# Patient Record
Sex: MALE | Race: WHITE | Employment: FULL TIME | URBAN - METROPOLITAN AREA
[De-identification: names, ages, dates, MRNs, and addresses within clinical notes are randomized per-mention and may not be internally consistent; named-entity substitution may affect disease eponyms.]

---

## 2022-02-23 ENCOUNTER — APPOINTMENT (OUTPATIENT)
Dept: CT IMAGING | Age: 52
DRG: 640 | End: 2022-02-23
Attending: EMERGENCY MEDICINE
Payer: COMMERCIAL

## 2022-02-23 ENCOUNTER — HOSPITAL ENCOUNTER (INPATIENT)
Age: 52
LOS: 6 days | Discharge: HOME | DRG: 640 | End: 2022-03-01
Attending: EMERGENCY MEDICINE | Admitting: ANESTHESIOLOGY
Payer: COMMERCIAL

## 2022-02-23 DIAGNOSIS — F10.10 ALCOHOL ABUSE: ICD-10-CM

## 2022-02-23 DIAGNOSIS — E87.1 HYPONATREMIA: Primary | ICD-10-CM

## 2022-02-23 DIAGNOSIS — R00.0 SINUS TACHYCARDIA: ICD-10-CM

## 2022-02-23 LAB
ALBUMIN SERPL-MCNC: 4.5 G/DL (ref 3.5–5)
ALBUMIN SERPL-MCNC: 4.6 G/DL (ref 3.5–5)
ALBUMIN/GLOB SERPL: 1.2 {RATIO} (ref 1.1–2.2)
ALP SERPL-CCNC: 71 U/L (ref 45–117)
ALT SERPL-CCNC: 74 U/L (ref 12–78)
ANION GAP SERPL CALC-SCNC: 11 MMOL/L (ref 5–15)
ANION GAP SERPL CALC-SCNC: 8 MMOL/L (ref 5–15)
ANION GAP SERPL CALC-SCNC: 9 MMOL/L (ref 5–15)
APPEARANCE UR: CLEAR
AST SERPL-CCNC: 74 U/L (ref 15–37)
BACTERIA URNS QL MICRO: NEGATIVE /HPF
BASOPHILS # BLD: 0 K/UL (ref 0–0.1)
BASOPHILS NFR BLD: 0 % (ref 0–1)
BILIRUB SERPL-MCNC: 0.9 MG/DL (ref 0.2–1)
BILIRUB UR QL: NEGATIVE
BUN SERPL-MCNC: 6 MG/DL (ref 6–20)
BUN SERPL-MCNC: 7 MG/DL (ref 6–20)
BUN SERPL-MCNC: 8 MG/DL (ref 6–20)
BUN/CREAT SERPL: 12 (ref 12–20)
BUN/CREAT SERPL: 9 (ref 12–20)
BUN/CREAT SERPL: 9 (ref 12–20)
CALCIUM SERPL-MCNC: 8.9 MG/DL (ref 8.5–10.1)
CALCIUM SERPL-MCNC: 9.4 MG/DL (ref 8.5–10.1)
CALCIUM SERPL-MCNC: 9.5 MG/DL (ref 8.5–10.1)
CHLORIDE SERPL-SCNC: 76 MMOL/L (ref 97–108)
CHLORIDE SERPL-SCNC: 77 MMOL/L (ref 97–108)
CHLORIDE SERPL-SCNC: 82 MMOL/L (ref 97–108)
CO2 SERPL-SCNC: 23 MMOL/L (ref 21–32)
CO2 SERPL-SCNC: 24 MMOL/L (ref 21–32)
CO2 SERPL-SCNC: 27 MMOL/L (ref 21–32)
COLOR UR: ABNORMAL
COMMENT, HOLDF: NORMAL
COMMENT, HOLDF: NORMAL
CREAT SERPL-MCNC: 0.68 MG/DL (ref 0.7–1.3)
CREAT SERPL-MCNC: 0.68 MG/DL (ref 0.7–1.3)
CREAT SERPL-MCNC: 0.78 MG/DL (ref 0.7–1.3)
CREAT UR-MCNC: 44.2 MG/DL
DIFFERENTIAL METHOD BLD: ABNORMAL
EOSINOPHIL # BLD: 0 K/UL (ref 0–0.4)
EOSINOPHIL NFR BLD: 0 % (ref 0–7)
EPITH CASTS URNS QL MICRO: ABNORMAL /LPF
ERYTHROCYTE [DISTWIDTH] IN BLOOD BY AUTOMATED COUNT: 11.7 % (ref 11.5–14.5)
GLOBULIN SER CALC-MCNC: 3.7 G/DL (ref 2–4)
GLUCOSE SERPL-MCNC: 100 MG/DL (ref 65–100)
GLUCOSE SERPL-MCNC: 88 MG/DL (ref 65–100)
GLUCOSE SERPL-MCNC: 99 MG/DL (ref 65–100)
GLUCOSE UR STRIP.AUTO-MCNC: NEGATIVE MG/DL
HCT VFR BLD AUTO: 40.9 % (ref 36.6–50.3)
HGB BLD-MCNC: 14.7 G/DL (ref 12.1–17)
HGB UR QL STRIP: NEGATIVE
HYALINE CASTS URNS QL MICRO: ABNORMAL /LPF (ref 0–5)
IMM GRANULOCYTES # BLD AUTO: 0 K/UL (ref 0–0.04)
IMM GRANULOCYTES NFR BLD AUTO: 0 % (ref 0–0.5)
KETONES UR QL STRIP.AUTO: 15 MG/DL
LEUKOCYTE ESTERASE UR QL STRIP.AUTO: NEGATIVE
LYMPHOCYTES # BLD: 0.8 K/UL (ref 0.8–3.5)
LYMPHOCYTES NFR BLD: 13 % (ref 12–49)
MAGNESIUM SERPL-MCNC: 2 MG/DL (ref 1.6–2.4)
MCH RBC QN AUTO: 33.5 PG (ref 26–34)
MCHC RBC AUTO-ENTMCNC: 35.9 G/DL (ref 30–36.5)
MCV RBC AUTO: 93.2 FL (ref 80–99)
MONOCYTES # BLD: 0.8 K/UL (ref 0–1)
MONOCYTES NFR BLD: 13 % (ref 5–13)
NEUTS SEG # BLD: 4.2 K/UL (ref 1.8–8)
NEUTS SEG NFR BLD: 74 % (ref 32–75)
NITRITE UR QL STRIP.AUTO: NEGATIVE
NRBC # BLD: 0 K/UL (ref 0–0.01)
NRBC BLD-RTO: 0 PER 100 WBC
OSMOLALITY SERPL: 240 MOSM/KG H2O
OSMOLALITY UR: 288 MOSM/KG H2O
PH UR STRIP: 8 [PH] (ref 5–8)
PHOSPHATE SERPL-MCNC: 3 MG/DL (ref 2.6–4.7)
PHOSPHATE SERPL-MCNC: 3.2 MG/DL (ref 2.6–4.7)
PLATELET # BLD AUTO: 266 K/UL (ref 150–400)
PMV BLD AUTO: 8.1 FL (ref 8.9–12.9)
POTASSIUM SERPL-SCNC: 3.9 MMOL/L (ref 3.5–5.1)
POTASSIUM SERPL-SCNC: 4.2 MMOL/L (ref 3.5–5.1)
POTASSIUM SERPL-SCNC: 4.6 MMOL/L (ref 3.5–5.1)
POTASSIUM UR-SCNC: 63 MMOL/L
PROT SERPL-MCNC: 8.3 G/DL (ref 6.4–8.2)
PROT UR STRIP-MCNC: NEGATIVE MG/DL
PROT UR-MCNC: 9 MG/DL (ref 0–11.9)
RBC # BLD AUTO: 4.39 M/UL (ref 4.1–5.7)
RBC #/AREA URNS HPF: ABNORMAL /HPF (ref 0–5)
RBC MORPH BLD: ABNORMAL
SAMPLES BEING HELD,HOLD: NORMAL
SAMPLES BEING HELD,HOLD: NORMAL
SODIUM SERPL-SCNC: 111 MMOL/L (ref 136–145)
SODIUM SERPL-SCNC: 112 MMOL/L (ref 136–145)
SODIUM SERPL-SCNC: 114 MMOL/L (ref 136–145)
SODIUM UR-SCNC: 38 MMOL/L
SP GR UR REFRACTOMETRY: 1.01 (ref 1–1.03)
TSH SERPL DL<=0.05 MIU/L-ACNC: 0.49 UIU/ML (ref 0.36–3.74)
UROBILINOGEN UR QL STRIP.AUTO: 1 EU/DL (ref 0.2–1)
WBC # BLD AUTO: 5.8 K/UL (ref 4.1–11.1)
WBC URNS QL MICRO: ABNORMAL /HPF (ref 0–4)

## 2022-02-23 PROCEDURE — 87086 URINE CULTURE/COLONY COUNT: CPT

## 2022-02-23 PROCEDURE — 74011000250 HC RX REV CODE- 250: Performed by: EMERGENCY MEDICINE

## 2022-02-23 PROCEDURE — 74011250637 HC RX REV CODE- 250/637: Performed by: NURSE PRACTITIONER

## 2022-02-23 PROCEDURE — 74011250636 HC RX REV CODE- 250/636: Performed by: EMERGENCY MEDICINE

## 2022-02-23 PROCEDURE — 83935 ASSAY OF URINE OSMOLALITY: CPT

## 2022-02-23 PROCEDURE — 84156 ASSAY OF PROTEIN URINE: CPT

## 2022-02-23 PROCEDURE — 84300 ASSAY OF URINE SODIUM: CPT

## 2022-02-23 PROCEDURE — 84443 ASSAY THYROID STIM HORMONE: CPT

## 2022-02-23 PROCEDURE — 65610000006 HC RM INTENSIVE CARE

## 2022-02-23 PROCEDURE — 74011000636 HC RX REV CODE- 636: Performed by: ANESTHESIOLOGY

## 2022-02-23 PROCEDURE — 99285 EMERGENCY DEPT VISIT HI MDM: CPT

## 2022-02-23 PROCEDURE — 83735 ASSAY OF MAGNESIUM: CPT

## 2022-02-23 PROCEDURE — 36415 COLL VENOUS BLD VENIPUNCTURE: CPT

## 2022-02-23 PROCEDURE — 81001 URINALYSIS AUTO W/SCOPE: CPT

## 2022-02-23 PROCEDURE — 71260 CT THORAX DX C+: CPT

## 2022-02-23 PROCEDURE — 93005 ELECTROCARDIOGRAM TRACING: CPT

## 2022-02-23 PROCEDURE — 74177 CT ABD & PELVIS W/CONTRAST: CPT

## 2022-02-23 PROCEDURE — 80069 RENAL FUNCTION PANEL: CPT

## 2022-02-23 PROCEDURE — 84100 ASSAY OF PHOSPHORUS: CPT

## 2022-02-23 PROCEDURE — 80053 COMPREHEN METABOLIC PANEL: CPT

## 2022-02-23 PROCEDURE — 74011000250 HC RX REV CODE- 250: Performed by: NURSE PRACTITIONER

## 2022-02-23 PROCEDURE — 70450 CT HEAD/BRAIN W/O DYE: CPT

## 2022-02-23 PROCEDURE — 82570 ASSAY OF URINE CREATININE: CPT

## 2022-02-23 PROCEDURE — 84133 ASSAY OF URINE POTASSIUM: CPT

## 2022-02-23 PROCEDURE — 85025 COMPLETE CBC W/AUTO DIFF WBC: CPT

## 2022-02-23 PROCEDURE — 83930 ASSAY OF BLOOD OSMOLALITY: CPT

## 2022-02-23 RX ORDER — 3% SODIUM CHLORIDE 3 G/100ML
100 INJECTION, SOLUTION INTRAVENOUS CONTINUOUS
Status: DISPENSED | OUTPATIENT
Start: 2022-02-23 | End: 2022-02-23

## 2022-02-23 RX ORDER — BISACODYL 5 MG
5 TABLET, DELAYED RELEASE (ENTERIC COATED) ORAL DAILY PRN
Status: DISCONTINUED | OUTPATIENT
Start: 2022-02-23 | End: 2022-03-01 | Stop reason: HOSPADM

## 2022-02-23 RX ORDER — SODIUM CHLORIDE 0.9 % (FLUSH) 0.9 %
5-40 SYRINGE (ML) INJECTION AS NEEDED
Status: DISCONTINUED | OUTPATIENT
Start: 2022-02-23 | End: 2022-03-01 | Stop reason: HOSPADM

## 2022-02-23 RX ORDER — LANOLIN ALCOHOL/MO/W.PET/CERES
100 CREAM (GRAM) TOPICAL DAILY
Status: DISCONTINUED | OUTPATIENT
Start: 2022-02-23 | End: 2022-02-24

## 2022-02-23 RX ORDER — LORAZEPAM 1 MG/1
1 TABLET ORAL
Status: DISCONTINUED | OUTPATIENT
Start: 2022-02-23 | End: 2022-03-01 | Stop reason: HOSPADM

## 2022-02-23 RX ORDER — FOLIC ACID 1 MG/1
1 TABLET ORAL DAILY
Status: DISCONTINUED | OUTPATIENT
Start: 2022-02-23 | End: 2022-03-01 | Stop reason: HOSPADM

## 2022-02-23 RX ORDER — LORAZEPAM 1 MG/1
2 TABLET ORAL
Status: DISCONTINUED | OUTPATIENT
Start: 2022-02-23 | End: 2022-03-01 | Stop reason: HOSPADM

## 2022-02-23 RX ORDER — SODIUM CHLORIDE 9 MG/ML
50 INJECTION, SOLUTION INTRAVENOUS CONTINUOUS
Status: DISCONTINUED | OUTPATIENT
Start: 2022-02-23 | End: 2022-02-24

## 2022-02-23 RX ORDER — SODIUM CHLORIDE 0.9 % (FLUSH) 0.9 %
5-40 SYRINGE (ML) INJECTION EVERY 8 HOURS
Status: DISCONTINUED | OUTPATIENT
Start: 2022-02-23 | End: 2022-03-01 | Stop reason: HOSPADM

## 2022-02-23 RX ORDER — ACETAMINOPHEN 325 MG/1
650 TABLET ORAL
Status: DISCONTINUED | OUTPATIENT
Start: 2022-02-23 | End: 2022-03-01 | Stop reason: HOSPADM

## 2022-02-23 RX ORDER — LANOLIN ALCOHOL/MO/W.PET/CERES
9 CREAM (GRAM) TOPICAL
Status: DISCONTINUED | OUTPATIENT
Start: 2022-02-23 | End: 2022-03-01 | Stop reason: HOSPADM

## 2022-02-23 RX ORDER — HYDRALAZINE HYDROCHLORIDE 20 MG/ML
10-20 INJECTION INTRAMUSCULAR; INTRAVENOUS
Status: DISCONTINUED | OUTPATIENT
Start: 2022-02-23 | End: 2022-03-01 | Stop reason: HOSPADM

## 2022-02-23 RX ORDER — ENOXAPARIN SODIUM 100 MG/ML
40 INJECTION SUBCUTANEOUS DAILY
Status: DISCONTINUED | OUTPATIENT
Start: 2022-02-24 | End: 2022-03-01 | Stop reason: HOSPADM

## 2022-02-23 RX ORDER — LABETALOL HYDROCHLORIDE 5 MG/ML
10 INJECTION, SOLUTION INTRAVENOUS ONCE
Status: COMPLETED | OUTPATIENT
Start: 2022-02-23 | End: 2022-02-23

## 2022-02-23 RX ORDER — SODIUM CHLORIDE 9 MG/ML
630 INJECTION, SOLUTION INTRAVENOUS CONTINUOUS
Status: DISCONTINUED | OUTPATIENT
Start: 2022-02-23 | End: 2022-02-23

## 2022-02-23 RX ORDER — ACETAMINOPHEN 650 MG/1
650 SUPPOSITORY RECTAL
Status: DISCONTINUED | OUTPATIENT
Start: 2022-02-23 | End: 2022-03-01 | Stop reason: HOSPADM

## 2022-02-23 RX ORDER — ONDANSETRON 2 MG/ML
4 INJECTION INTRAMUSCULAR; INTRAVENOUS
Status: DISCONTINUED | OUTPATIENT
Start: 2022-02-23 | End: 2022-03-01 | Stop reason: HOSPADM

## 2022-02-23 RX ADMIN — LORAZEPAM 2 MG: 1 TABLET ORAL at 20:40

## 2022-02-23 RX ADMIN — Medication 1 TABLET: at 16:56

## 2022-02-23 RX ADMIN — LORAZEPAM 2 MG: 1 TABLET ORAL at 18:03

## 2022-02-23 RX ADMIN — SODIUM CHLORIDE 100 ML/HR: 3 INJECTION, SOLUTION INTRAVENOUS at 16:01

## 2022-02-23 RX ADMIN — FOLIC ACID 1 MG: 1 TABLET ORAL at 16:38

## 2022-02-23 RX ADMIN — LABETALOL HYDROCHLORIDE 10 MG: 5 INJECTION INTRAVENOUS at 16:57

## 2022-02-23 RX ADMIN — IOPAMIDOL 100 ML: 755 INJECTION, SOLUTION INTRAVENOUS at 17:28

## 2022-02-23 RX ADMIN — Medication 100 MG: at 16:56

## 2022-02-23 RX ADMIN — SODIUM CHLORIDE, PRESERVATIVE FREE 10 ML: 5 INJECTION INTRAVENOUS at 16:05

## 2022-02-23 NOTE — PROGRESS NOTES
The course was discussed with Dr. Allan Sandhu (ER Attending) via telephone this afternoon. There is a history of fall. The serum sodium was 111. Recommend a 100 ml bolus of 3 % Saline (now) --> over 30 minutes. Repeat the serum sodium in 2 to 3 hours. Will follow.

## 2022-02-23 NOTE — CONSULTS
NEPHROLOGY CONSULT NOTE     Patient: Juan West MRN: [id number]  PCP: None   :     1970  Age:   49 y.o. Sex:  male      Referring physician: Allan Sparks DO     Reason for consultation:     Hyponatremia. Mr. Kassidy Cho was seen and examined in the emergency room at [facility] this afternoon. He was located in bed #3. Mrs. Donaldson was at the bedside during my visit. His nurse was in attendance as well. Admission Date: 2022  1:52 PM  LOS: 0 days     DISCUSSION / PLAN :   Mr. Marcus Camacho has symptomatic hyponatremia. I suspect that the presentation is in large part secondary to excessive alcohol intake. He admitted consuming 6 - 8 beers per day for many years. This represents a low solute diet. The urine creatinine was only 44.2. Additionally, his urine osmolality was 288. He was also on hydrochlorothiazide. Hydrochlorothiazide can impair the ability of the kidneys to excrete free water. It may contribute to hyponatremia as well. I would recommend a bolus of 3% saline. 100 cc of 3% saline can be administered over 30 minutes. Thereafter his serum sodium should be monitored every 2-3 hours. A rate of correction of 4 to 6 mEq/L/day initially seems reasonable    Check the cortisol level to rule out adrenal insufficiency. Check the serum osmolality. Abstinence from alcohol was strongly recommended this evening. I would avoid hydrochlorothiazide in the future. Lisinopril can at times cause ADH release and contribute to hyponatremia. I would consider an ARB (eg. Losartan) as part of his regimen to address hypertension. Active Problems / Assessment AAActive  : Active Problems:    Hyponatremia (2022)    Alcohol abuse. Hypertension. Subjective:       Abnormal labs. HPI:     Mr. Kassidy Cho is a 55-year-old gentleman with a history of hypertension and alcohol abuse.   He readily admitted drinking 6-8 beers per day for approximately 30 years. It seems he may have had an issue with hyponatremia over the last several years. Over the last 7 to 10 days he reported a poor appetite. Additionall,y there was a history of ataxia, diplopia and dizziness. There was no history of seizure activity. There was no history of chest pain, fever, chills, shortness of breath or sore throat. He reported nausea. However, there was no history of vomiting. There was no history of dysuria nor hematuria. There was no history of myalgia or arthralgia. Ms. Torrence Dancer was started on hydrochlorothiazide to address hypertension within the past 6 months. He was already on lisinopril. He had routine labs drawn by his Primary Care Pysician this week. He was called earlier today about extremely low serum sodium. He was advised to present for further evaluation. The initial work-up in the ER showed a serum sodium of 111. Past Medical Hx:     Hypertension. There is no history of diabetes mellitus, coronary artery disease, congestive heart failure. There is no history of major surgical procedures. Past Surgical Hx:   No past surgical history on file. Medications:  Prior to Admission medications    Not on File     Lisinopril. Hydrochlorothiazide. No Known Allergies    Social Hx:         Mr. Torrence Dancer is . He works in the [employer]. He has consumed alcohol in excess for approximately 30 years. He does not smoke cigarettes. He does not use illicit drugs. Review of Systems:    See HPI. Objective:    Vitals:    Vitals:    02/23/22 1429 02/23/22 1506 02/23/22 1657 02/23/22 1701   BP: (!) 186/103 (!) 179/104 (!) 169/83 (!) 173/94   Pulse: (!) 103 94 94 94   Resp: 17 18 22   Temp:    97.6 °F (36.4 °C)   SpO2: 100% 100%  98%   Weight:       Height:         I&O's:  No intake/output data recorded.   Visit Vitals  BP (!) 173/94   Pulse 94   Temp 97.6 °F (36.4 °C)   Resp 22   Ht 5' 8\" (1.727 m)   Wt 88.6 kg (195 lb 5.2 oz) SpO2 98%   BMI 29.70 kg/m²       Physical Exam:      Mr. Anaya Juan was seen and examined in the emergency room at [facility].  He was in bed #3. His wife was present. General: He was lying in bed quite comfortably. Head: Normocephalic. He was wearing a mask. Mucous membranes: Moist.    Cardiovascular: S1, S2, no S3 gallop no pericardial rub. Respiratory: Breath sounds are vesicular, no wheezes, no rales. Abdomen: Not distended. Lower extremities: No pretibial edema. Neuro: Alert and oriented. He answers all questions appropriately. There was no asterixis. Psych: He appeared mildly anxious. Laboratory Results:    Lab Results   Component Value Date    BUN 6 2022     (LL) 2022    K 4.2 2022    CL 77 (L) 2022    CO2 24 2022       Lab Results   Component Value Date    BUN 6 2022    BUN 7 2022    K 4.2 2022    K 4.6 2022       Lab Results   Component Value Date    WBC 5.8 2022    RBC 4.39 2022    HGB 14.7 2022    HCT 40.9 2022    MCV 93.2 2022    MCH 33.5 2022    RDW 11.7 2022     2022       Lab Results   Component Value Date    PHOS 3.0 2022       Urine dipstick:         I have reviewed the followin [place] discussed with:  Mr Frandy Johnson. Chart reviewed. Thank you for allowing us to participate in the care of this patient. We will follow patient. Please dont hesitate to call with any questions    Mackenzie Romo MD  2022    Trent Keene  [address]  Phone - [phone]   Fax - [phone]  www. [employer]

## 2022-02-23 NOTE — MED STUDENT NOTES
*ATTENTION: This note has been created by a nurse practitioner student for educational purposes only. Please do not refer to the content of this note for clinical decision-making, billing, or other purposes. Please see attending providers note to obtain clinical information on this patient. *           Critical Care History and Physical     Name: Coleman Dominguez   : 1970   MRN: [id number]   Date: 2022      Reason for ICU Admission: Hyponatremia     HPI:  Mr. Shana Mccormick is a 49year old male with a PMH of HTN, anxiety, and alcohol abuse. He presented to [facility] ED for weakness/dizziness. He stated that 1 week ago his appetite declined and throughout the week his inappetance continued while he hydrated with approximately 5 water bottles per day in addition to the 12 cans of beer he drinks at night. Patient states he suffers from insomnia and anxiety and he uses the beer to self medicate at night. Neither him nor his wife seemed to think this was a problem since he never appeared drunk, woke up hung over, or missed any days of work due to his drinking. There have been several falls at home recently. He currently takes Lisinopril and Hydrochlorothiazide for his HTN and reports he sees his physician every 6 months to have this evaluated. His chemistry revealed significant hyponatremia at 111. He received a 3% sodium chloride bolus with frequent monitoring of his sodium levels. He was hypertensive as well. Critical care was consulted and patient will be transferred to the ICU for monitoring while his sodium levels are corrected. Patient did note that he had a similar episode in January with lower sodium levels and his physician had recommended gatorade to help replenish his electrolytes. Subjective:   Assessed patient in the ED. He was very anxious over having to be admitted. He neuro exam was benign with the exception of slight hand tremors.  We discussed the plan of care and the importance of correcting his sodium levels slowly over the next few days. Patient is in agreement. POD:  * No surgery found *    S/P:       Active Problem List:     Problem List  Never Reviewed          Codes Class    Hyponatremia ICD-10-CM: E87.1  ICD-9-CM: 276.1               Past Medical History:    has no past medical history on file. Past Surgical History:    has no past surgical history on file. Home Medications:     Prior to Admission medications    Not on File       Allergies/Social/Family History:   No Known Allergies   Social History     Tobacco Use    Smoking status: Not on file    Smokeless tobacco: Not on file   Substance Use Topics    Alcohol use: Not on file      No family history on file. Review of Systems:   A comprehensive review of systems was negative except for: Neurological: positive for tremor     Objective:   Vital Signs:  Visit Vitals  BP (!) 186/103   Pulse (!) 103   Temp 98.2 °F (36.8 °C)   Resp 17   Ht 5' 8\" (1.727 m)   Wt 88.6 kg (195 lb 5.2 oz)   SpO2 100%   BMI 29.70 kg/m²      O2 Device: None (Room air) Temp (24hrs), Av.7 °F (36.5 °C), Min:97.2 °F (36.2 °C), Max:98.2 °F (36.8 °C)           Intake/Output:   No intake or output data in the 24 hours ending 22 1553    Physical Exam:  General:  alert, cooperative, anxious, appears stated age  Eye:  Both Pupils round and reactive, EOM intact. Normal gaze. Neurologic:  No focal deficit. Slight tremor in hands. Lungs:  clear to auscultation bilaterally  Heart:  regular rate and rhythm, S1, S2 normal, no murmur, click, rub or gallop  Abdomen:  soft, non-tender.  Bowel sounds normal. No masses,  no organomegaly  Cardiovascular:  pedal pulses normal and no edema  Skin:  no rash or abnormalities    LABS AND  DATA: Personally reviewed  Recent Labs     22  1303   WBC 5.8   HGB 14.7   HCT 40.9        Recent Labs     22  1303   *   K 4.6   CL 76*   CO2 27   BUN 7   CREA 0.78      CA 9.4     Recent Labs     22  1303   AP 71   TP 8.3*   ALB 4.6   GLOB 3.7     No results for input(s): INR, PTP, APTT, INREXT in the last 72 hours. No results for input(s): PHI, PCO2I, PO2I, FIO2I in the last 72 hours. No results for input(s): CPK, CKMB, TROIQ, BNPP in the last 72 hours. Hemodynamics:   PAP:   CO:     Wedge:   CI:     CVP:    SVR:       PVR:       Ventilator Settings:  Mode Rate Tidal Volume Pressure FiO2 PEEP                    Peak airway pressure:      Minute ventilation:        MEDS:   Current Facility-Administered Medications:     sodium chloride (NS) flush 5-40 mL, 5-40 mL, IntraVENous, Q8H, Vishnu Ballesteros MD    sodium chloride (NS) flush 5-40 mL, 5-40 mL, IntraVENous, PRN, Vishnu Ballesteros MD    iopamidoL (ISOVUE-370) 76 % injection 100 mL, 100 mL, IntraVENous, RAD ONCE, Michael López DO    sodium chloride (NS) flush 5-40 mL, 5-40 mL, IntraVENous, Q8H, Estuardo Maynard, NP-JANN    sodium chloride (NS) flush 5-40 mL, 5-40 mL, IntraVENous, PRN, Charlsie Prader R, NP-JANN    acetaminophen (TYLENOL) tablet 650 mg, 650 mg, Oral, Q6H PRN **OR** acetaminophen (TYLENOL) suppository 650 mg, 650 mg, Rectal, Q6H PRN, Estuardo Kumar NP-C    [START ON 2/24/2022] enoxaparin (LOVENOX) injection 40 mg, 40 mg, SubCUTAneous, DAILY, Estuardo Maynard NP-C    ondansetron (ZOFRAN) injection 4 mg, 4 mg, IntraVENous, Q6H PRN, Estuardo Kumar NP-C    bisacodyL (DULCOLAX) tablet 5 mg, 5 mg, Oral, DAILY PRN, Charlsie Prader R, NP-JANN    LORazepam (ATIVAN) tablet 1 mg, 1 mg, Oral, Q1H PRN, Charlsie Prader R, NP-JANN    LORazepam (ATIVAN) tablet 2 mg, 2 mg, Oral, Q1H PRN, Charlsie Prader R, NP-C    thiamine HCL (B-1) tablet 100 mg, 100 mg, Oral, DAILY, Estuardo Maynard NP-C    folic acid (FOLVITE) tablet 1 mg, 1 mg, Oral, DAILY, Estuardo Kumar, FILIPPO    multivitamin, tx-iron-ca-min (THERA-M w/ IRON) tablet 1 Tablet, 1 Tablet, Oral, DAILY, Charlsie Prader R, NP-C  No current outpatient medications on file.     Chest X-Ray:  CXR Results (Last 48 hours)    None            CT Scans:  CT Results  (Last 48 hours)    None            ECHO:  none    Assessment:     1. Severe Hyponatremia   2. Hypertension  3. Alcoholism      ICU Comprehensive Plan of Care:   Plans for this Shift:     1. Neuro/ Pain/ Sedation   a. Significant Hyponatremia, 111  b. 3% NaCl bolus , sodium levels q 3 hrs  c. CIWA monitoring for alcohol withdrawal, PO Ativan as needed. d. Fall history/ CT scan head, chest, abdomen, and pelvis pending  2. Resp   a. No respiratory symptoms   b. COVID Treatment: no current test  c. SpO2 Goal: > 92%  3. CVS   a. Hx of HTN  b. SBP Goal of: > 90 mmHg and < 180 mmHg  c. MAP Goal of: > 65 mmHg   d. Labetalol and PRN hydralazine q 6 hrs for HTN, will consider restarting Lisinopril tomorrow.   e. IVFs: one time 3% NaCL bolus for hypernatremia  4. Heme/ Onc  A. H/H stable  B. Transfusion Trigger (Hgb): <7 g/dL  5. GI  a. Anorexia for past week   b. Regular Diet  c.   6. Renal/   a. 1500 ml Fluid restriction due to hyponatremia  b. BUN/Creat WNL  7. ID  A. No need for antibiotics currently     8. Endocrine  A. Glucose 100  B.  No history of DM      Discussed Plan of Care/Code Status: Yes / Full Code  Appreciate Consultants Input: Nephrology  Discussed Care Plan with Bedside RN  Documentation of Current Medications    F - Feeding:  Yes   A - Analgesia: Acetaminophen  S - Sedation: None  T - DVT Prophylaxis: Lovenox   H - Head of Bed: > 30 Degrees  U - Ulcer Prophylaxis: Not at this time   G - Glycemic Control: Prevent Hypoglycemia / PRN SSI  S - Spontaneous Breathing Trial: N/A  B - Bowel Regimen: None needed at this time  I - Indwelling Catheter:   Tubes: None  Lines: Peripheral IV  Drains: None  D - De-escalation of Antibiotics: N/A     Multidisciplinary Rounds Completed:  Pending    ABCDEF Bundle/Checklist Completed:  Yes    SPECIAL EQUIPMENT  None    DISPOSITION  Stay in ICU    CRITICAL CARE CONSULTANT NOTE  I had a face to face encounter with the patient, reviewed and interpreted patient data including clinical events, labs, images, vital signs, I/O's, and examined patient. I have discussed the case and the plan and management of the patient's care with the consulting services, the bedside nurses and the respiratory therapist.      NOTE OF PERSONAL INVOLVEMENT IN CARE   This patient has a high probability of imminent, clinically significant deterioration, which requires the highest level of preparedness to intervene urgently. I participated in the decision-making and personally managed or directed the management of the following life and organ supporting interventions that required my frequent assessment to treat or prevent imminent deterioration. EMR entered and chart reviewed in the course of carrying out educational functions and responsibilities as a Nurse Practitioner student following Fernanda Mcdonough NP.      JUAN DAVID Mobley  [employer]

## 2022-02-23 NOTE — ED PROVIDER NOTES
52-year-old male with a history of heavy alcohol use presents with a chief complaint of hyponatremia. Patient states that he had recent outpatient labs which showed a low sodium level. He has had several falls at home recently and reports insomnia and decreased appetite. He denies any other symptoms. He has not had any seizures. He denies history of alcohol withdrawal.  He does state that he drinks approximately 12 beers daily. No past medical history on file. No past surgical history on file. No family history on file. Social History     Socioeconomic History    Marital status:      Spouse name: Not on file    Number of children: Not on file    Years of education: Not on file    Highest education level: Not on file   Occupational History    Not on file   Tobacco Use    Smoking status: Not on file    Smokeless tobacco: Not on file   Substance and Sexual Activity    Alcohol use: Not on file    Drug use: Not on file    Sexual activity: Not on file   Other Topics Concern    Not on file   Social History Narrative    Not on file     Social Determinants of Health     Financial Resource Strain:     Difficulty of Paying Living Expenses: Not on file   Food Insecurity:     Worried About Running Out of Food in the Last Year: Not on file    Jackie of Food in the Last Year: Not on file   Transportation Needs:     Lack of Transportation (Medical): Not on file    Lack of Transportation (Non-Medical):  Not on file   Physical Activity:     Days of Exercise per Week: Not on file    Minutes of Exercise per Session: Not on file   Stress:     Feeling of Stress : Not on file   Social Connections:     Frequency of Communication with Friends and Family: Not on file    Frequency of Social Gatherings with Friends and Family: Not on file    Attends [employer] Services: Not on file    Active Member of Clubs or Organizations: Not on file    Attends Club or Organization Meetings: Not on file    Marital Status: Not on file   Intimate Partner Violence:     Fear of Current or Ex-Partner: Not on file    Emotionally Abused: Not on file    Physically Abused: Not on file    Sexually Abused: Not on file   Housing Stability:     Unable to Pay for Housing in the Last Year: Not on file    Number of [place] in the Last Year: Not on file    Unstable Housing in the Last Year: Not on file         ALLERGIES: Patient has no allergy information on record. Review of Systems   Constitutional: Negative for fever. HENT: Negative for rhinorrhea. Respiratory: Negative for cough. Cardiovascular: Negative for chest pain. Gastrointestinal: Negative for abdominal pain. Genitourinary: Negative for dysuria. Musculoskeletal: Negative for back pain. Skin: Negative for wound. Neurological: Negative for headaches. Psychiatric/Behavioral: Negative for confusion. Vitals:    02/23/22 1252 02/23/22 1403   BP: (!) 169/115 (!) 179/101   Pulse: 98 92   Resp: 16 18   Temp: 97.2 °F (36.2 °C) 98.2 °F (36.8 °C)   SpO2: 100% 100%            Physical Exam  Vitals and nursing note reviewed. Constitutional:       General: He is not in acute distress. Appearance: Normal appearance. He is not ill-appearing, toxic-appearing or diaphoretic. HENT:      Head: Normocephalic. Eyes:      Extraocular Movements: Extraocular movements intact. Cardiovascular:      Rate and Rhythm: Normal rate. Pulses: Normal pulses. Heart sounds: Normal heart sounds. Pulmonary:      Effort: Pulmonary effort is normal. No respiratory distress. Breath sounds: Normal breath sounds. Abdominal:      General: There is no distension. Musculoskeletal:         General: Normal range of motion. Cervical back: Normal range of motion. Skin:     General: Skin is dry. Capillary Refill: Capillary refill takes less than 2 seconds. Neurological:      General: No focal deficit present.       Mental Status: He is alert and oriented to person, place, and time. Psychiatric:         Mood and Affect: Mood normal.          MDM  Number of Diagnoses or Management Options  Alcohol abuse  Hyponatremia  Diagnosis management comments:       Patient presents with hyponatremia. His sodium was found to be 111. He has no focal deficits although he has been falling recently. Nephrology was consulted. They recommended 3% saline, 100 mL over 1 hour. Urine electrolytes and osmolality are all pending at this time. CT of the head is pending as well as CT of the chest abdomen pelvis with IV contrast.  Differentials for his presentation include but not limited to alcohol abuse, SIADH, renal causes among others. Patient will be admitted to ICU for further management.     Total critical care time spent exclusive of procedures:  40 minutes         Amount and/or Complexity of Data Reviewed  Clinical lab tests: ordered and reviewed           Procedures

## 2022-02-23 NOTE — H&P
Critical Care History and Physical     Name: Eddie Spangler   : 1970   MRN: [id number]   Date: 2022      Reason for ICU Admission: Hyponatremia     HPI:  Mr. Joseph Alvarez is a 49year old male with a PMH of HTN, anxiety, and alcohol abuse. He presented to [facility] ED for weakness/dizziness. He stated that 1 week ago his appetite declined and throughout the week his inappetance continued while he hydrated with approximately 5 water bottles per day in addition to the 12 cans of beer he drinks at night. Patient states he suffers from insomnia and anxiety and he uses the beer to self medicate at night. Neither him nor his wife seemed to think this was a problem since he never appeared drunk, woke up hung over, or missed any days of work due to his drinking. There have been several falls at home recently. He currently takes Lisinopril and Hydrochlorothiazide for his HTN and reports he sees his physician every 6 months to have this evaluated. His chemistry revealed significant hyponatremia at 111. He received a 3% sodium chloride bolus with frequent monitoring of his sodium levels. He was hypertensive as well. Critical care was consulted and patient will be transferred to the ICU for monitoring while his sodium levels are corrected. Patient did note that he had a similar episode in January with lower sodium levels and his physician had recommended gatorade to help replenish his electrolytes. Subjective:   Assessed patient in the ED. He was very anxious over having to be admitted. He neuro exam was benign with the exception of slight hand tremors. We discussed the plan of care and the importance of correcting his sodium levels slowly over the next few days. Patient is in agreement.      POD:  * No surgery found *    S/P:       Active Problem List:     Problem List  Never Reviewed          Codes Class    Hyponatremia ICD-10-CM: E87.1  ICD-9-CM: 276.1               Past Medical History:    has no past medical history on file. Past Surgical History:    has no past surgical history on file. Home Medications:     Prior to Admission medications    Not on File       Allergies/Social/Family History:   No Known Allergies   Social History     Tobacco Use    Smoking status: Not on file    Smokeless tobacco: Not on file   Substance Use Topics    Alcohol use: Not on file      No family history on file. Review of Systems:   A comprehensive review of systems was negative except for: Neurological: positive for tremor     Objective:   Vital Signs:  Visit Vitals  BP (!) 173/94   Pulse 94   Temp 97.6 °F (36.4 °C)   Resp 22   Ht 5' 8\" (1.727 m)   Wt 88.6 kg (195 lb 5.2 oz)   SpO2 98%   BMI 29.70 kg/m²      O2 Device: None (Room air) Temp (24hrs), Av.7 °F (36.5 °C), Min:97.2 °F (36.2 °C), Max:98.2 °F (36.8 °C)           Intake/Output:   No intake or output data in the 24 hours ending 225    Physical Exam:  General:  alert, cooperative, anxious, appears stated age  Eye:  Both Pupils round and reactive, EOM intact. Normal gaze. Neurologic:  No focal deficit. Slight tremor in hands. Lungs:  clear to auscultation bilaterally  Heart:  regular rate and rhythm, S1, S2 normal, no murmur, click, rub or gallop  Abdomen:  soft, non-tender. Bowel sounds normal. No masses,  no organomegaly  Cardiovascular:  pedal pulses normal and no edema  Skin:  no rash or abnormalities    LABS AND  DATA: Personally reviewed  Recent Labs     22  1303   WBC 5.8   HGB 14.7   HCT 40.9        Recent Labs     22  1516 22  1415 22  1303   *  --  111*   K 4.2  --  4.6   CL 77*  --  76*   CO2 24  --  27   BUN 6  --  7   CREA 0.68*  --  0.78   GLU 99  --  100   CA 8.9  --  9.4   MG  --  2.0  --    PHOS 3.0  --   --      Recent Labs     22  1303   AP 71   TP 8.3*   ALB 4.6   GLOB 3.7     No results for input(s): INR, PTP, APTT, INREXT, INREXT in the last 72 hours.    No results for input(s): PHI, PCO2I, PO2I, FIO2I in the last 72 hours. No results for input(s): CPK, CKMB, TROIQ, BNPP in the last 72 hours.     Hemodynamics:   PAP:   CO:     Wedge:   CI:     CVP:    SVR:       PVR:       Ventilator Settings:  Mode Rate Tidal Volume Pressure FiO2 PEEP                    Peak airway pressure:      Minute ventilation:        MEDS:   Current Facility-Administered Medications:     sodium chloride (NS) flush 5-40 mL, 5-40 mL, IntraVENous, Q8H, Poonam Magaña MD, 10 mL at 02/23/22 1605    sodium chloride (NS) flush 5-40 mL, 5-40 mL, IntraVENous, PRN, Poonam Magaña MD    sodium chloride (NS) flush 5-40 mL, 5-40 mL, IntraVENous, Q8H, Estuardo Maynard NP-JANN    sodium chloride (NS) flush 5-40 mL, 5-40 mL, IntraVENous, PRN, Myron CERVANTES, NP-C    acetaminophen (TYLENOL) tablet 650 mg, 650 mg, Oral, Q6H PRN **OR** acetaminophen (TYLENOL) suppository 650 mg, 650 mg, Rectal, Q6H PRN, Estuardo Lund NP-JANN    [START ON 2/24/2022] enoxaparin (LOVENOX) injection 40 mg, 40 mg, SubCUTAneous, DAILY, Estuardo Maynard NP-JANN    ondansetron (ZOFRAN) injection 4 mg, 4 mg, IntraVENous, Q6H PRN, Estuardo Lund NP-C    bisacodyL (DULCOLAX) tablet 5 mg, 5 mg, Oral, DAILY PRN, Myron CERVANTES, NP-C    LORazepam (ATIVAN) tablet 1 mg, 1 mg, Oral, Q1H PRN, Myron CERVANTES, NP-C    LORazepam (ATIVAN) tablet 2 mg, 2 mg, Oral, Q1H PRN, Myron CERVANTES, NP-C, 2 mg at 02/23/22 1803    thiamine HCL (B-1) tablet 100 mg, 100 mg, Oral, DAILY, Myron CERVANTES, NP-C, 100 mg at 25/11/46 3670    folic acid (FOLVITE) tablet 1 mg, 1 mg, Oral, DAILY, Myron CERVANTES, NP-C, 1 mg at 02/23/22 1638    multivitamin, tx-iron-ca-min (THERA-M w/ IRON) tablet 1 Tablet, 1 Tablet, Oral, DAILY, Myron CERVANTES, NP-C, 1 Tablet at 02/23/22 1656    hydrALAZINE (APRESOLINE) 20 mg/mL injection 10-20 mg, 10-20 mg, IntraVENous, Q6H PRN, Myron CERVANTES NP-C    Chest X-Ray:  CXR Results  (Last 48 hours)    None            CT Scans:  CT Results (Last 48 hours)    None            ECHO:  none    Assessment:     1. Severe Hyponatremia   2. Hypertension  3. Alcoholism      ICU Comprehensive Plan of Care:   Plans for this Shift:     1. Neuro/ Pain/ Sedation   a. Significant Hyponatremia, 111  b. 3% NaCl bolus , sodium levels q 3 hrs  c. CIWA monitoring for alcohol withdrawal, PO Ativan as needed. d. Fall history/ CT scan head, chest, abdomen, and pelvis pending  2. Resp   a. No respiratory symptoms   b. COVID Treatment: no current test  c. SpO2 Goal: > 92%  3. CVS   a. Hx of HTN  b. SBP Goal of: > 90 mmHg and < 180 mmHg  c. MAP Goal of: > 65 mmHg   d. Labetalol and PRN hydralazine q 6 hrs for HTN, will consider restarting Lisinopril tomorrow.   e. IVFs: one time 3% NaCL bolus for hypernatremia  4. Heme/ Onc  A. H/H stable  B. Transfusion Trigger (Hgb): <7 g/dL  5. GI  a. Anorexia for past week   b. Regular Diet  c.   6. Renal/   a. 1500 ml Fluid restriction due to hyponatremia  b. BUN/Creat WNL  7. ID  A. No need for antibiotics currently     8. Endocrine  A. Glucose 100  B.  No history of DM      Discussed Plan of Care/Code Status: Yes / Full Code  Appreciate Consultants Input: Nephrology  Discussed Care Plan with Bedside RN  Documentation of Current Medications    F - Feeding:  Yes   A - Analgesia: Acetaminophen  S - Sedation: None  T - DVT Prophylaxis: Lovenox   H - Head of Bed: > 30 Degrees  U - Ulcer Prophylaxis: Not at this time   G - Glycemic Control: Prevent Hypoglycemia / PRN SSI  S - Spontaneous Breathing Trial: N/A  B - Bowel Regimen: None needed at this time  I - Indwelling Catheter:   Tubes: None  Lines: Peripheral IV  Drains: None  D - De-escalation of Antibiotics: N/A     Multidisciplinary Rounds Completed:  Pending    ABCDEF Bundle/Checklist Completed:  Yes    SPECIAL EQUIPMENT  None    DISPOSITION  Stay in ICU    CRITICAL CARE CONSULTANT NOTE  I had a face to face encounter with the patient, reviewed and interpreted patient data including clinical events, labs, images, vital signs, I/O's, and examined patient. I have discussed the case and the plan and management of the patient's care with the consulting services, the bedside nurses and the respiratory therapist.      NOTE OF PERSONAL INVOLVEMENT IN CARE   This patient has a high probability of imminent, clinically significant deterioration, which requires the highest level of preparedness to intervene urgently. I participated in the decision-making and personally managed or directed the management of the following life and organ supporting interventions that required my frequent assessment to treat or prevent imminent deterioration. I personally spent 45 minutes of critical care time. This is time spent at this critically ill patient's bedside actively involved in patient care as well as the coordination of care and discussions with the patient's family. This does not include any procedural time which has been billed separately.     Kobe Quiros AGACNP-BC     [address] [facility]

## 2022-02-23 NOTE — PROGRESS NOTES
ICU consult note brief update    ICU consult received. Appreciate nephrology input. 3% saline, will require close monitoring within the ICU. Full note to follow.     Carolyn Lopes, DO   Staff Intensivist/Anesthesiologist  Critical Care Medicine

## 2022-02-23 NOTE — ED NOTES
TRANSFER - OUT REPORT:    Verbal report given to Zoie(name) on Oswaldo Barros  being transferred to ICU(unit) for routine progression of care       Report consisted of patients Situation, Background, Assessment and   Recommendations(SBAR). Information from the following report(s) SBAR, Kardex, ED Summary and MAR was reviewed with the receiving nurse. Lines:   Peripheral IV 02/23/22 Left Antecubital (Active)        Opportunity for questions and clarification was provided.       Patient transported with:   Registered Nurse

## 2022-02-24 LAB
ALBUMIN SERPL-MCNC: 3.2 G/DL (ref 3.5–5)
ALBUMIN SERPL-MCNC: 4.2 G/DL (ref 3.5–5)
ALBUMIN SERPL-MCNC: 4.2 G/DL (ref 3.5–5)
ALBUMIN SERPL-MCNC: 4.4 G/DL (ref 3.5–5)
ANION GAP SERPL CALC-SCNC: 10 MMOL/L (ref 5–15)
ANION GAP SERPL CALC-SCNC: 7 MMOL/L (ref 5–15)
ANION GAP SERPL CALC-SCNC: 7 MMOL/L (ref 5–15)
ANION GAP SERPL CALC-SCNC: 9 MMOL/L (ref 5–15)
ANION GAP SERPL CALC-SCNC: 9 MMOL/L (ref 5–15)
BACTERIA SPEC CULT: NORMAL
BUN SERPL-MCNC: 20 MG/DL (ref 6–20)
BUN SERPL-MCNC: 32 MG/DL (ref 6–20)
BUN SERPL-MCNC: 8 MG/DL (ref 6–20)
BUN SERPL-MCNC: 8 MG/DL (ref 6–20)
BUN SERPL-MCNC: 9 MG/DL (ref 6–20)
BUN/CREAT SERPL: 12 (ref 12–20)
BUN/CREAT SERPL: 13 (ref 12–20)
BUN/CREAT SERPL: 13 (ref 12–20)
BUN/CREAT SERPL: 23 (ref 12–20)
BUN/CREAT SERPL: 45 (ref 12–20)
CALCIUM SERPL-MCNC: 8.9 MG/DL (ref 8.5–10.1)
CALCIUM SERPL-MCNC: 9.1 MG/DL (ref 8.5–10.1)
CALCIUM SERPL-MCNC: 9.4 MG/DL (ref 8.5–10.1)
CALCIUM SERPL-MCNC: 9.4 MG/DL (ref 8.5–10.1)
CALCIUM SERPL-MCNC: 9.5 MG/DL (ref 8.5–10.1)
CHLORIDE SERPL-SCNC: 83 MMOL/L (ref 97–108)
CHLORIDE SERPL-SCNC: 85 MMOL/L (ref 97–108)
CHLORIDE SERPL-SCNC: 88 MMOL/L (ref 97–108)
CHLORIDE SERPL-SCNC: 88 MMOL/L (ref 97–108)
CHLORIDE SERPL-SCNC: 89 MMOL/L (ref 97–108)
CO2 SERPL-SCNC: 18 MMOL/L (ref 21–32)
CO2 SERPL-SCNC: 23 MMOL/L (ref 21–32)
CO2 SERPL-SCNC: 24 MMOL/L (ref 21–32)
CREAT SERPL-MCNC: 0.63 MG/DL (ref 0.7–1.3)
CREAT SERPL-MCNC: 0.65 MG/DL (ref 0.7–1.3)
CREAT SERPL-MCNC: 0.71 MG/DL (ref 0.7–1.3)
CREAT SERPL-MCNC: 0.72 MG/DL (ref 0.7–1.3)
CREAT SERPL-MCNC: 0.87 MG/DL (ref 0.7–1.3)
ERYTHROCYTE [DISTWIDTH] IN BLOOD BY AUTOMATED COUNT: 11.9 % (ref 11.5–14.5)
GLUCOSE SERPL-MCNC: 110 MG/DL (ref 65–100)
GLUCOSE SERPL-MCNC: 113 MG/DL (ref 65–100)
GLUCOSE SERPL-MCNC: 121 MG/DL (ref 65–100)
GLUCOSE SERPL-MCNC: 88 MG/DL (ref 65–100)
GLUCOSE SERPL-MCNC: 90 MG/DL (ref 65–100)
HCT VFR BLD AUTO: 39.1 % (ref 36.6–50.3)
HGB BLD-MCNC: 14.2 G/DL (ref 12.1–17)
MCH RBC QN AUTO: 34.1 PG (ref 26–34)
MCHC RBC AUTO-ENTMCNC: 36.3 G/DL (ref 30–36.5)
MCV RBC AUTO: 94 FL (ref 80–99)
NRBC # BLD: 0 K/UL (ref 0–0.01)
NRBC BLD-RTO: 0 PER 100 WBC
PHOSPHATE SERPL-MCNC: 2.7 MG/DL (ref 2.6–4.7)
PHOSPHATE SERPL-MCNC: 2.9 MG/DL (ref 2.6–4.7)
PHOSPHATE SERPL-MCNC: 3.5 MG/DL (ref 2.6–4.7)
PHOSPHATE SERPL-MCNC: 3.8 MG/DL (ref 2.6–4.7)
PLATELET # BLD AUTO: 243 K/UL (ref 150–400)
PMV BLD AUTO: 8.6 FL (ref 8.9–12.9)
POTASSIUM SERPL-SCNC: 3.5 MMOL/L (ref 3.5–5.1)
POTASSIUM SERPL-SCNC: 3.6 MMOL/L (ref 3.5–5.1)
POTASSIUM SERPL-SCNC: 3.8 MMOL/L (ref 3.5–5.1)
POTASSIUM SERPL-SCNC: 3.8 MMOL/L (ref 3.5–5.1)
POTASSIUM SERPL-SCNC: 4.8 MMOL/L (ref 3.5–5.1)
RBC # BLD AUTO: 4.16 M/UL (ref 4.1–5.7)
SERVICE CMNT-IMP: NORMAL
SODIUM SERPL-SCNC: 113 MMOL/L (ref 136–145)
SODIUM SERPL-SCNC: 115 MMOL/L (ref 136–145)
SODIUM SERPL-SCNC: 119 MMOL/L (ref 136–145)
SODIUM SERPL-SCNC: 119 MMOL/L (ref 136–145)
SODIUM SERPL-SCNC: 120 MMOL/L (ref 136–145)
SODIUM SERPL-SCNC: 120 MMOL/L (ref 136–145)
SODIUM SERPL-SCNC: 121 MMOL/L (ref 136–145)
WBC # BLD AUTO: 5.2 K/UL (ref 4.1–11.1)

## 2022-02-24 PROCEDURE — 74011250637 HC RX REV CODE- 250/637: Performed by: NURSE PRACTITIONER

## 2022-02-24 PROCEDURE — 74011250637 HC RX REV CODE- 250/637: Performed by: INTERNAL MEDICINE

## 2022-02-24 PROCEDURE — 74011000250 HC RX REV CODE- 250: Performed by: EMERGENCY MEDICINE

## 2022-02-24 PROCEDURE — 74011000250 HC RX REV CODE- 250: Performed by: NURSE PRACTITIONER

## 2022-02-24 PROCEDURE — 65610000006 HC RM INTENSIVE CARE

## 2022-02-24 PROCEDURE — 85027 COMPLETE CBC AUTOMATED: CPT

## 2022-02-24 PROCEDURE — 84295 ASSAY OF SERUM SODIUM: CPT

## 2022-02-24 PROCEDURE — 36415 COLL VENOUS BLD VENIPUNCTURE: CPT

## 2022-02-24 PROCEDURE — 74011250636 HC RX REV CODE- 250/636: Performed by: NURSE PRACTITIONER

## 2022-02-24 PROCEDURE — 80048 BASIC METABOLIC PNL TOTAL CA: CPT

## 2022-02-24 PROCEDURE — 74011250637 HC RX REV CODE- 250/637: Performed by: ANESTHESIOLOGY

## 2022-02-24 PROCEDURE — 74011250636 HC RX REV CODE- 250/636: Performed by: INTERNAL MEDICINE

## 2022-02-24 PROCEDURE — 80069 RENAL FUNCTION PANEL: CPT

## 2022-02-24 PROCEDURE — 74011000250 HC RX REV CODE- 250: Performed by: INTERNAL MEDICINE

## 2022-02-24 RX ORDER — DESMOPRESSIN ACETATE 4 UG/ML
2 INJECTION, SOLUTION INTRAVENOUS; SUBCUTANEOUS ONCE
Status: COMPLETED | OUTPATIENT
Start: 2022-02-24 | End: 2022-02-24

## 2022-02-24 RX ORDER — SODIUM CHLORIDE 450 MG/100ML
75 INJECTION, SOLUTION INTRAVENOUS CONTINUOUS
Status: DISCONTINUED | OUTPATIENT
Start: 2022-02-24 | End: 2022-02-24

## 2022-02-24 RX ORDER — LANOLIN ALCOHOL/MO/W.PET/CERES
200 CREAM (GRAM) TOPICAL DAILY
Status: COMPLETED | OUTPATIENT
Start: 2022-02-24 | End: 2022-02-27

## 2022-02-24 RX ADMIN — Medication 1 PACKET: at 12:45

## 2022-02-24 RX ADMIN — SODIUM CHLORIDE, PRESERVATIVE FREE 5 ML: 5 INJECTION INTRAVENOUS at 06:30

## 2022-02-24 RX ADMIN — LORAZEPAM 2 MG: 1 TABLET ORAL at 00:24

## 2022-02-24 RX ADMIN — SODIUM CHLORIDE 75 ML/HR: 4.5 INJECTION, SOLUTION INTRAVENOUS at 16:58

## 2022-02-24 RX ADMIN — ENOXAPARIN SODIUM 40 MG: 100 INJECTION SUBCUTANEOUS at 08:26

## 2022-02-24 RX ADMIN — SODIUM CHLORIDE, PRESERVATIVE FREE 5 ML: 5 INJECTION INTRAVENOUS at 00:25

## 2022-02-24 RX ADMIN — Medication 9 MG: at 23:23

## 2022-02-24 RX ADMIN — SODIUM CHLORIDE, PRESERVATIVE FREE 10 ML: 5 INJECTION INTRAVENOUS at 21:17

## 2022-02-24 RX ADMIN — LORAZEPAM 2 MG: 1 TABLET ORAL at 20:21

## 2022-02-24 RX ADMIN — SODIUM CHLORIDE 100 ML/HR: 9 INJECTION, SOLUTION INTRAVENOUS at 00:10

## 2022-02-24 RX ADMIN — Medication 9 MG: at 00:06

## 2022-02-24 RX ADMIN — Medication 1 TABLET: at 08:26

## 2022-02-24 RX ADMIN — Medication 200 MG: at 08:26

## 2022-02-24 RX ADMIN — LORAZEPAM 2 MG: 1 TABLET ORAL at 23:22

## 2022-02-24 RX ADMIN — DESMOPRESSIN ACETATE 2 MCG: 4 SOLUTION INTRAVENOUS at 08:26

## 2022-02-24 RX ADMIN — Medication 1 PACKET: at 18:04

## 2022-02-24 RX ADMIN — LORAZEPAM 2 MG: 1 TABLET ORAL at 16:58

## 2022-02-24 RX ADMIN — FOLIC ACID 1 MG: 1 TABLET ORAL at 08:26

## 2022-02-24 RX ADMIN — LORAZEPAM 1 MG: 1 TABLET ORAL at 03:17

## 2022-02-24 RX ADMIN — LORAZEPAM 2 MG: 1 TABLET ORAL at 08:26

## 2022-02-24 RX ADMIN — HYDRALAZINE HYDROCHLORIDE 10 MG: 20 INJECTION, SOLUTION INTRAMUSCULAR; INTRAVENOUS at 18:04

## 2022-02-24 RX ADMIN — LORAZEPAM 2 MG: 1 TABLET ORAL at 12:45

## 2022-02-24 NOTE — PROGRESS NOTES
The most recent serum sodium was 120. Will start  1/2 Normal Saline  @ 75 mls per hour (in an effort to slow the rate of correction). Continue q 2 to 3 hourly lab monitoring.

## 2022-02-24 NOTE — PROGRESS NOTES
The latest lytes were reviewed. The serum sodium is correcting ahead of schedule. Will d/c N/Saline. Give a dose of DDAVP now. Continue q 2 to 3 hourly sodium monitoring.

## 2022-02-24 NOTE — ADT AUTH CERT NOTES
Facility Name: [facility]                                 Patient Demographics    Patient Name   Madhu Tiwari Legal Sex   Male    1970 Address   [address] [address] Phone   [phone] (Home) *Preferred*   [phone] [facility]     Hospital Account    Name Acct ID Class Status Primary Coverage   Madhu Tiwari [id number] INPATIENT Open BLUE CROSS - [facility]            Guarantor Account (for Hospital Account [de-identified])    Name Relation to Pt Service Area Active? Acct Type   Madhu Tiwari Self [facility] Yes Personal/Family   Address Phone     [address] [facility], [address] [place] [address] [phone](A)              Coverage Information (for Hospital Account [de-identified])    F/O Payor/Plan Subscriber  Subscriber Sex Precert #   BLUE CROSS/[place] BLUE CROSS OUT OF STATE [address]    Subscriber Subscriber #   Madhu Tiwari [id number]   [facility] # Group Name   [id number]    Address Phone   [address]    Policy Number Status Effective Date Benefits Phone   [id number] -  -   Auth/Cert               Diagnosis     Codes Comments   Hyponatremia  ICD-10-CM: E87.1   ICD-9-CM: 276.1             Admission Information    Arrival Date/Time: 2022 1230 Admit Date/Time: 2022 1352 IP Adm.  Date/Time: 2022 1450   Admission Type: Emergency Point of Origin: Non-health Care Facility/self Admit Category:    Means of Arrival: Car Primary Service: Medicine Secondary Service: N/A   Transfer Source:  Service Area: [facility] Unit: [address] Provider: Viridiana Hunter DO Attending Provider: Aristeo Salazar MD Referring Provider:      Admission Information    Attending Provider Admission Dx Admitted on   Viridiana Hunter DO Hyponatremia 22   Service Isolation Code Status   Medicine -- Full Code   Allergies Advance Care Planning    No Known Allergies Jump to the Activity       Admission Information    Unit/Bed: [facility] 7S2 INTENSIVE CARE/01 Service: Medicine   Admitting provider: Gonzalo Sterling DO Phone: [phone]   Attending provider: Gonzalo Sterling DO Phone: [phone]   PCP: Aliya Montez MD Phone: [phone]   Admission dx:  Patient class: I   Admission type: ER           Systemic or Infectious Condition GRG - Care Day 2 (2/24/2022) by Anna Jung RN       Review Status Review Entered   Completed 2/24/2022 14:19      Criteria Review      Care Day: 2 Care Date: 2/24/2022 Level of Care: ICU    Guideline Day 2    Clinical Status    ( ) * No ICU or intermediate care needs    2/24/2022 14:16:46 EST by Anna Jung      ICU    Interventions    (X) Inpatient interventions continue    2/24/2022 14:16:46 EST by Anna Jung      Lovenox 40mg sc qd, DDAVP 2mcg IV x1, NS 50hr - dc at 0650    Seizure Prec, Regular diet w/1500ml fluid restriction, Renal panel/Na q3, I/O, Weigh qd, Cardiac monitor, SCDs, Activity as ada w/assist    ( ) Transition to oral routes    2/24/2022 14:16:46 EST by Anna Jung      Folvite 1mg qd, Ativan 1mg x1 and 2mg x3, Melatonin 9mg x1, Thera M w/iron qd, B1 200mg qd, Ure-Na 15g bid    * Milestone   Additional Notes   2/24/22        LOC: INPT    ICU        98.7-77-17, 140/104, 98% ra        Na 115 - 119 - 113, Cl 83 - 85 - 88, CO2 18, Alb 3.2         CRITICAL CARE       Assessed patient in the ED. He was very anxious over having to be admitted. He neuro exam was benign with the exception of slight hand tremors. We discussed the plan of care and the importance of correcting his sodium levels slowly over the next few days. Patient is in agreement. Physical Exam:   General:  alert, cooperative, anxious, appears stated age   Eye:  Both Pupils round and reactive, EOM intact. Normal gaze. Neurologic:  No focal deficit. Slight tremor in hands.     Lungs:  clear to auscultation bilaterally   Heart:  regular rate and rhythm, S1, S2 normal, no murmur, click, rub or gallop Abdomen:  soft, non-tender. Bowel sounds normal. No masses,  no organomegaly   Cardiovascular:  pedal pulses normal and no edema   Skin:  no rash or abnormalities      ICU Comprehensive Plan of Care:   Plans for this Shift:        1. Neuro/ Pain/ Sedation    a. Na increasing therapy per Nephrology   b. Drew Montalvo monitoring for alcohol withdrawal, PO Ativan as needed. 2. Resp    a. No respiratory symptoms    b. COVID Treatment: no current test   c. SpO2 Goal: > 92%   3. CVS    a. Hx of HTN   b. SBP Goal of: > 90 mmHg and < 180 mmHg   c. MAP Goal of: > 65 mmHg    d. Labetalol and PRN hydralazine q 6 hrs for HTN, will consider restarting Lisinopril tomorrow. 4. Heme/ Onc   A.  H/H stable   B.  Transfusion Trigger (Hgb): <7 g/dL   5. GI   a. Anorexia for past week    b. Regular Diet   c.     6. Renal/    a. 1500 ml Fluid restriction due to hyponatremia   b. BUN/Creat WNL   7. ID   A. No need for antibiotics currently        8. Endocrine   A. Glucose 100   B. No history of DM         NEPHROLOGY      Assessment:   Hyponatremia (Severe) - likely sec to low/poor solute intake associated with alcohol abuse. (The serum osmo was 240, urine osmo 288, urine creat 44, urine Na 38). Hypertension   Alcohol Abuse - with withdrawal           Plan:   Mr Lizzie Gill received 3 % saline in the ER yesterday. His serum sodium went from 111  --> 119 --> 113. The initial rate of correction is 4 to 6 meq/l/day. His symptoms now seem consistent with alcohol withdrawal (tremors,visual hallucinations) vs symptomatic hyponatremia. Will hold further doses of 3 % saline for now. Will start Ure NA --> 15 grams po BID (in an effort to increase free water clearance). Increase the solute intake of the diet. Hold lisinopril. Avoid HCTZ in the future. Continue q 2 to 3 hourly sodium monitoring.    Management of alcohol withdrawal will be deferred to the ICU team.   Abstinence from alcohol was stressed yesterday.          Systemic or Infectious Condition GRG - Care Day 1 (2/23/2022) by Neo Mendoza RN       Review Status Review Entered   Completed 2/24/2022 10:07      Criteria Review      Care Day: 1 Care Date: 2/23/2022 Level of Care: ICU    Guideline Day 1    Level Of Care    (X) ICU or intermediate care    2/24/2022 10:07:57 EST by Neo Mendoza      ICU    Clinical Status    (X) * Clinical Indications met    2/24/2022 10:07:57 EST by Neo Mendoza      Na <130 despite tx - Na 111 - 112- 114    Interventions    (X) Inpatient interventions as needed    2/24/2022 10:07:57 EST by Neo Mendoza      3% NS 100hr, Folvite 1mg po qd, Ativan 2mg po x2, Thera M w/iron qd, Labetalol 10mg IV x1, B1 100mg po qd    Seizure Prec, Regular diet 1500ml fluid restriction, Na/Renal panel q3h, I/O, Cardiac monitor, Weigh qd, SCD, Activity as ada w/assist    * Milestone   Additional Notes   2/23/22         LOC: INPT    ICU        97.2-98-16, 169/115, 100% ra    BP max 190/110, 180/111            CBC wnl; Na 111 - 112 - 114, Cl 76 -77 - 82, AST 76, T Prot 8.3        UA neg; Urine cx        CT Head: No acute intracranial abnormality.        CT Abd/Chest/Pelvis:     1. No acute process. 2. Hepatic steatosis. Possible cirrhosis. No portal hypertension       EKG: Normal sinus rhythm    Pulmonary disease pattern    Left anterior fascicular block         NEPHROLOGY CONSULT      DISCUSSION / PLAN :    Mr. Anushka Rojas has symptomatic hyponatremia.  I suspect that the presentation is in large part secondary to excessive alcohol intake.  He admitted consuming 6 - 8 beers per day for many years.  This represents a low solute diet.  The urine creatinine was only 44. 2.  Additionally, his urine osmolality was 288.    He was also on hydrochlorothiazide.  Hydrochlorothiazide can impair the ability of the kidneys to excrete free water.  It may contribute to hyponatremia as well.       I would recommend a bolus of 3% saline.  100 cc of 3% saline can be administered over 30 minutes.  Thereafter his serum sodium should be monitored every 2-3 hours.  A rate of correction of 4 to 6 mEq/L/day initially seems reasonable       Check the cortisol level to rule out adrenal insufficiency.       Check the serum osmolality.       Abstinence from alcohol was strongly recommended this evening.       I would avoid hydrochlorothiazide in the future.       Lisinopril can at times cause ADH release and contribute to hyponatremia.  I would consider an ARB (eg. Losartan) as part of his regimen to address hypertension.                  Systemic or Infectious Condition GRG - Clinical Indications for Admission to Inpatient Care by Tahir Coronel RN       Review Status Review Entered   Completed 2/24/2022 10:00      Criteria Review      Clinical Indications for Admission to Inpatient Care    Most Recent : Tahir Coronel Most Recent Date: 2/24/2022 10:00:51 EST    (X) Hospital admission [A] is needed for appropriate care of the patient because of  1 or more     of the following :       (X) Severe electrolyte abnormalities requiring inpatient care       2/24/2022 10:00:51 EST by Jose Garcia 111 - 112 - 114 - despite tx   Additional Notes   49year old male with a PMH of HTN, anxiety, and alcohol abuse. He presented to [facility] ED for weakness/dizziness. He stated that 1 week ago his appetite declined and throughout the week his inappetance continued while he hydrated with approximately 5 water bottles per day in addition to the 12 cans of beer he drinks at night. Patient states he suffers from insomnia and anxiety and he uses the beer to self medicate at night. Neither him nor his wife seemed to think this was a problem since he never appeared drunk, woke up hung over, or missed any days of work due to his drinking. There have been several falls at home recently.  He currently takes Lisinopril and Hydrochlorothiazide for his HTN and reports he sees his physician every 6 months to have this evaluated. His chemistry revealed significant hyponatremia at 111. He received a 3% sodium chloride bolus with frequent monitoring of his sodium levels. He was hypertensive as well.  Critical care was consulted and patient will be transferred to the ICU for monitoring while his sodium levels are corrected. Patient did note that he had a similar episode in January with lower sodium levels and his physician had recommended gatorade to help replenish his electrolytes. Physical Exam:   General:  alert, cooperative, anxious, appears stated age   Eye:  Both Pupils round and reactive, EOM intact. Normal gaze. Neurologic:  No focal deficit. Slight tremor in hands. Lungs:  clear to auscultation bilaterally   Heart:  regular rate and rhythm, S1, S2 normal, no murmur, click, rub or gallop   Abdomen:  soft, non-tender. Bowel sounds normal. No masses,  no organomegaly   Cardiovascular:  pedal pulses normal and no edema   Skin:  no rash or abnormalities       Assessment:       1. Severe Hyponatremia    2. Hypertension   3. Alcoholism           ICU Comprehensive Plan of Care:   Plans for this Shift:        1. Neuro/ Pain/ Sedation    a. Significant Hyponatremia, 111   b. 3% NaCl bolus , sodium levels q 3 hrs   c. CIWA monitoring for alcohol withdrawal, PO Ativan as needed. d. Fall history/ CT scan head, chest, abdomen, and pelvis pending   2. Resp    a. No respiratory symptoms    b. COVID Treatment: no current test   c. SpO2 Goal: > 92%   3. CVS    a. Hx of HTN   b. SBP Goal of: > 90 mmHg and < 180 mmHg   c. MAP Goal of: > 65 mmHg    d. Labetalol and PRN hydralazine q 6 hrs for HTN, will consider restarting Lisinopril tomorrow.    e. IVFs: one time 3% NaCL bolus for hypernatremia   4. Heme/ Onc   A.  H/H stable   B.  Transfusion Trigger (Hgb): <7 g/dL   5. GI   a. Anorexia for past week    b.  Regular Diet   c.     6. Renal/    a. 1500 ml Fluid restriction due to hyponatremia   b. BUN/Creat WNL   7. ID   A. No need for antibiotics currently        8. Endocrine   A. Glucose 100   B.  No history of DM

## 2022-02-24 NOTE — ADT AUTH CERT NOTES
Facility Name: [facility]                                 Patient Demographics    Patient Name   Craie Barone Legal Sex   Male    1970 Address   [address] [address] Phone   [phone] (Home) *Preferred*   [phone] [facility]     Hospital Account    Name Acct ID Class Status Primary Coverage   Carie Barone [id number] INPATIENT Open BLUE CROSS - [facility]            Guarantor Account (for Hospital Account [de-identified])    Name Relation to Pt Service Area Active? Acct Type   Carie Barone Self [facility] Yes Personal/Family   Address Phone     [facility] [facility], [address] [place] [address] [phone](K)              Coverage Information (for Hospital Account [de-identified])    F/O Payor/Plan Subscriber  Subscriber Sex Precert #   BLUE CROSS/[place] BLUE CROSS OUT OF STATE [address]    Subscriber Subscriber #   Carie Barone [id number]   [facility] # Group Name   [id number]    Address Phone   [address]    Policy Number Status Effective Date Benefits Phone   [id number] -  -   Auth/Cert               Diagnosis     Codes Comments   Hyponatremia  ICD-10-CM: E87.1   ICD-9-CM: 276.1             Admission Information    Arrival Date/Time: 2022 1230 Admit Date/Time: 2022 1352 IP Adm.  Date/Time: 2022 1450   Admission Type: Emergency Point of Origin: Non-health Care Facility/self Admit Category:    Means of Arrival: Car Primary Service: Medicine Secondary Service: N/A   Transfer Source:  Service Area: [facility] Unit: [address] Provider: Mendy Toscano DO Attending Provider: Poonam Magaña MD Referring Provider:      Admission Information    Attending Provider Admission Dx Admitted on   Mendy Toscano DO Hyponatremia 22   Service Isolation Code Status   Medicine -- Full Code   Allergies Advance Care Planning    No Known Allergies Jump to the Activity       Admission Information    Unit/Bed: [facility] 7S2 INTENSIVE CARE/01 Service: Medicine   Admitting provider: Carly Jacob DO Phone: [phone]   Attending provider: Carly Jacob DO Phone: [phone]   PCP: Rosie Tam MD Phone: [phone]   Admission dx:  Patient class: I   Admission type: ER

## 2022-02-24 NOTE — PROGRESS NOTES
Critical Care Progress Note    Name: Sharon Mirza   : 1970   MRN: [id number]   Date: 2022      Reason for ICU Admission: Hyponatremia     HPI:  Mr. Maximo Canavan is a 49year old male with a PMH of HTN, anxiety, and alcohol abuse. He presented to [facility] ED for weakness/dizziness. He stated that 1 week ago his appetite declined and throughout the week his inappetance continued while he hydrated with approximately 5 water bottles per day in addition to the 12 cans of beer he drinks at night. Patient states he suffers from insomnia and anxiety and he uses the beer to self medicate at night. Neither him nor his wife seemed to think this was a problem since he never appeared drunk, woke up hung over, or missed any days of work due to his drinking. There have been several falls at home recently. He currently takes Lisinopril and Hydrochlorothiazide for his HTN and reports he sees his physician every 6 months to have this evaluated. His chemistry revealed significant hyponatremia at 111. He received a 3% sodium chloride bolus with frequent monitoring of his sodium levels. He was hypertensive as well. Critical care was consulted and patient will be transferred to the ICU for monitoring while his sodium levels are corrected. Patient did note that he had a similar episode in January with lower sodium levels and his physician had recommended gatorade to help replenish his electrolytes. Subjective:   Assessed patient in the ED. He was very anxious over having to be admitted. He neuro exam was benign with the exception of slight hand tremors. We discussed the plan of care and the importance of correcting his sodium levels slowly over the next few days. Patient is in agreement.      Overnight Events   - 3%, Desmoppressin      Active Problem List:     Problem List  Never Reviewed          Codes Class    Hyponatremia ICD-10-CM: E87.1  ICD-9-CM: 276.1               Past Medical History:    has no past medical history on file. Past Surgical History:    has no past surgical history on file. Home Medications:     Prior to Admission medications    Not on File       Allergies/Social/Family History:   No Known Allergies   Social History     Tobacco Use    Smoking status: Not on file    Smokeless tobacco: Not on file   Substance Use Topics    Alcohol use: Not on file      No family history on file. Review of Systems:   A comprehensive review of systems was negative except for: Neurological: positive for tremor     Objective:   Vital Signs:  Visit Vitals  BP (!) 140/104 (BP 1 Location: Right arm, BP Patient Position: At rest)   Pulse 77   Temp 98.7 °F (37.1 °C)   Resp 17   Ht 5' 8\" (1.727 m)   Wt 88.6 kg (195 lb 5.2 oz)   SpO2 100%   BMI 29.70 kg/m²      O2 Device: None (Room air) Temp (24hrs), Av.3 °F (36.8 °C), Min:97.2 °F (36.2 °C), Max:99 °F (37.2 °C)           Intake/Output:     Intake/Output Summary (Last 24 hours) at 2022 5364  Last data filed at 2022 0400  Gross per 24 hour   Intake 963.33 ml   Output 1875 ml   Net -911.67 ml       Physical Exam:  General:  alert, cooperative, anxious, appears stated age  Eye:  Both Pupils round and reactive, EOM intact. Normal gaze. Neurologic:  No focal deficit. Slight tremor in hands. Lungs:  clear to auscultation bilaterally  Heart:  regular rate and rhythm, S1, S2 normal, no murmur, click, rub or gallop  Abdomen:  soft, non-tender.  Bowel sounds normal. No masses,  no organomegaly  Cardiovascular:  pedal pulses normal and no edema  Skin:  no rash or abnormalities    LABS AND  DATA: Personally reviewed  Recent Labs     22  03122  1303   WBC 5.2 5.8   HGB 14.2 14.7   HCT 39.1 40.9    266     Recent Labs     222 22  0032 22  1516 22  1415   * 115*   < > 114*   < >  --    K 3.6 3.5   < > 3.9   < >  --    CL 85* 83*   < > 82*   < >  --    CO2 24 23   < > 23   < >  --    BUN 9 8   < > 8   < >  --    CREA 0.72 0.65*   < > 0.68*   < >  --    GLU 88 110*   < > 88   < >  --    CA 8.9 9.4   < > 9.5   < >  --    MG  --   --   --   --   --  2.0   PHOS  --  3.5  --  3.2   < >  --     < > = values in this interval not displayed. Recent Labs     02/24/22  0032 02/23/22 2038 02/23/22  1303 02/23/22  1303   AP  --   --   --  71   TP  --   --   --  8.3*   ALB 4.4 4.5   < > 4.6   GLOB  --   --   --  3.7    < > = values in this interval not displayed. No results for input(s): INR, PTP, APTT, INREXT, INREXT in the last 72 hours. No results for input(s): PHI, PCO2I, PO2I, FIO2I in the last 72 hours. No results for input(s): CPK, CKMB, TROIQ, BNPP in the last 72 hours.     Hemodynamics:   PAP:   CO:     Wedge:   CI:     CVP:    SVR:       PVR:       Ventilator Settings:  Mode Rate Tidal Volume Pressure FiO2 PEEP                    Peak airway pressure:      Minute ventilation:        MEDS:   Current Facility-Administered Medications:     sodium chloride (NS) flush 5-40 mL, 5-40 mL, IntraVENous, Q8H, Garcia Lyons MD, 5 mL at 02/24/22 0630    sodium chloride (NS) flush 5-40 mL, 5-40 mL, IntraVENous, PRN, Garcia Lyons MD    sodium chloride (NS) flush 5-40 mL, 5-40 mL, IntraVENous, Q8H, Estuardo Maynard NP-JANN, 5 mL at 02/24/22 0630    sodium chloride (NS) flush 5-40 mL, 5-40 mL, IntraVENous, PRN, FILIPPO Johnson    acetaminophen (TYLENOL) tablet 650 mg, 650 mg, Oral, Q6H PRN **OR** acetaminophen (TYLENOL) suppository 650 mg, 650 mg, Rectal, Q6H PRN, FILIPPO Johnson    enoxaparin (LOVENOX) injection 40 mg, 40 mg, SubCUTAneous, DAILY, Estuardo Maynard NP-C    ondansetron (ZOFRAN) injection 4 mg, 4 mg, IntraVENous, Q6H PRN, Estuardo Jackson NP-C    bisacodyL (DULCOLAX) tablet 5 mg, 5 mg, Oral, DAILY PRN, FILIPPO Johnson    LORazepam (ATIVAN) tablet 1 mg, 1 mg, Oral, Q1H PRN, FILIPPO Johnson, 1 mg at 02/24/22 0317    LORazepam (ATIVAN) tablet 2 mg, 2 mg, Oral, Q1H PRN, Ludin Awkward R, NP-C, 2 mg at 02/24/22 0024    thiamine HCL (B-1) tablet 100 mg, 100 mg, Oral, DAILY, Ludin Awkward R, NP-C, 100 mg at 95/33/71 4524    folic acid (FOLVITE) tablet 1 mg, 1 mg, Oral, DAILY, Ludin Awkward R, NP-C, 1 mg at 02/23/22 1638    multivitamin, tx-iron-ca-min (THERA-M w/ IRON) tablet 1 Tablet, 1 Tablet, Oral, DAILY, Ludin Awkward R, NP-C, 1 Tablet at 02/23/22 1656    hydrALAZINE (APRESOLINE) 20 mg/mL injection 10-20 mg, 10-20 mg, IntraVENous, Q6H PRN, Ludin Awkward R, NP-C    melatonin tablet 9 mg, 9 mg, Oral, QHS PRN, Brittany Jose Maria, NP, 9 mg at 02/24/22 0006    0.9% sodium chloride infusion, 50 mL/hr, IntraVENous, CONTINUOUS, Brittany Jose Maria, NP, Last Rate: 50 mL/hr at 02/24/22 0429, 50 mL/hr at 02/24/22 0429    Chest X-Ray:  CXR Results  (Last 48 hours)    None            CT Scans:  CT Results  (Last 48 hours)               02/23/22 1724  CT ABD PELV W CONT Final result    Impression:  1. No acute process. 2. Hepatic steatosis. Possible cirrhosis. No portal hypertension. Narrative:  CT CHEST, ABDOMEN, PELVIS WITH CONTRAST. 2/23/2022 5:24 PM        INDICATION: Hyponatremia. COMPARISON: None. TECHNIQUE: CT of the chest, abdomen, and pelvis was performed after the   administration 100 cc IV Isovue-370. CT dose reduction was achieved through use   of a standardized protocol tailored for this examination and automatic exposure   control for dose modulation. FINDINGS:   Chest: The lungs are clear. The central airways are patent. No pneumothorax or   pleural effusion. The heart size is normal. Left main coronary artery   calcifications are mild. The left vertebral artery is replaced from the aortic   arch, a normal variant. No thoracic lymphadenopathy. There is an old, healed   fracture of the right clavicle. There is refluxed fluid in the mid esophagus   (3-33). Abdomen: The liver is fatty infiltrated.  Subtle undersurface nodularity suggests   cirrhosis. No portal vein dilation, splenomegaly, large portosystemic   collaterals, or ascites. Duplication of the right renal collecting system and duplication of the proximal   ureter are normal variants. The duplicated ureters merge on image [id number] in the   midportion. The stomach, duodenum, gallbladder, pancreas, spleen, adrenals, and   left kidney are normal.       Pelvis: Descending and sigmoid diverticulosis are mild. The small bowel,   ileocecal junction, appendix, colon, and bladder are otherwise normal. No free   air or fluid, and no abdominopelvic lymphadenopathy. Incidental note is made of   bilateral L5 pars interarticularis defects. 02/23/22 1724  CT HEAD WO CONT Final result    Impression:  No acute intracranial abnormality. Narrative:  HEAD CT WITHOUT CONTRAST: 2/23/2022 5:24 PM       INDICATION: Hyponatremia. COMPARISON: None. PROCEDURE: Axial images of the head were obtained without contrast. Coronal and   sagittal reformats were performed. CT dose reduction was achieved through use of   a standardized protocol tailored for this examination and automatic exposure   control for dose modulation. FINDINGS: The ventricles and sulci are appropriate in size and configuration for   age. No loss of gray-white differentiation to suggest late acute or early   subacute infarction. No mass effect or intracranial hemorrhage. 02/23/22 1724  CT CHEST W CONT Final result    Impression:  1. No acute process. 2. Hepatic steatosis. Possible cirrhosis. No portal hypertension. Narrative:  CT CHEST, ABDOMEN, PELVIS WITH CONTRAST. 2/23/2022 5:24 PM        INDICATION: Hyponatremia. COMPARISON: None. TECHNIQUE: CT of the chest, abdomen, and pelvis was performed after the   administration 100 cc IV Isovue-370.  CT dose reduction was achieved through use   of a standardized protocol tailored for this examination and automatic exposure control for dose modulation. FINDINGS:   Chest: The lungs are clear. The central airways are patent. No pneumothorax or   pleural effusion. The heart size is normal. Left main coronary artery   calcifications are mild. The left vertebral artery is replaced from the aortic   arch, a normal variant. No thoracic lymphadenopathy. There is an old, healed   fracture of the right clavicle. There is refluxed fluid in the mid esophagus   (3-33). Abdomen: The liver is fatty infiltrated. Subtle undersurface nodularity suggests   cirrhosis. No portal vein dilation, splenomegaly, large portosystemic   collaterals, or ascites. Duplication of the right renal collecting system and duplication of the proximal   ureter are normal variants. The duplicated ureters merge on image [id number] in the   midportion. The stomach, duodenum, gallbladder, pancreas, spleen, adrenals, and   left kidney are normal.       Pelvis: Descending and sigmoid diverticulosis are mild. The small bowel,   ileocecal junction, appendix, colon, and bladder are otherwise normal. No free   air or fluid, and no abdominopelvic lymphadenopathy. Incidental note is made of   bilateral L5 pars interarticularis defects. ECHO:  none    Assessment:     1. Severe Hyponatremia   2. Hypertension  3. Alcoholism      ICU Comprehensive Plan of Care:   Plans for this Shift:     1. Neuro/ Pain/ Sedation   a. Na increasing therapy per Nephrology  b. [facility] monitoring for alcohol withdrawal, PO Ativan as needed. 2. Resp   a. No respiratory symptoms   b. COVID Treatment: no current test  c. SpO2 Goal: > 92%  3. CVS   a. Hx of HTN  b. SBP Goal of: > 90 mmHg and < 180 mmHg  c. MAP Goal of: > 65 mmHg   d. Labetalol and PRN hydralazine q 6 hrs for HTN, will consider restarting Lisinopril tomorrow. 4. Heme/ Onc  A. H/H stable  B. Transfusion Trigger (Hgb): <7 g/dL  5. GI  a. Anorexia for past week   b.  Regular Diet  c.   6. Renal/   a. 1500 ml Fluid restriction due to hyponatremia  b. BUN/Creat WNL  7. ID  A. No need for antibiotics currently     8. Endocrine  A. Glucose 100  B. No history of DM      Discussed Plan of Care/Code Status: Yes / Full Code  Appreciate Consultants Input: Nephrology  Discussed Care Plan with Bedside RN  Documentation of Current Medications    F - Feeding:  Yes   A - Analgesia: Acetaminophen  S - Sedation: None  T - DVT Prophylaxis: Lovenox   H - Head of Bed: > 30 Degrees  U - Ulcer Prophylaxis: Not at this time   G - Glycemic Control: Prevent Hypoglycemia / PRN SSI  S - Spontaneous Breathing Trial: N/A  B - Bowel Regimen: None needed at this time  I - Indwelling Catheter:   Tubes: None  Lines: Peripheral IV  Drains: None  D - De-escalation of Antibiotics: N/A     Multidisciplinary Rounds Completed:  Pending    ABCDEF Bundle/Checklist Completed:  Yes    SPECIAL EQUIPMENT  None    DISPOSITION  Stay in ICU    CRITICAL CARE CONSULTANT NOTE  I had a face to face encounter with the patient, reviewed and interpreted patient data including clinical events, labs, images, vital signs, I/O's, and examined patient. I have discussed the case and the plan and management of the patient's care with the consulting services, the bedside nurses and the respiratory therapist.      NOTE OF PERSONAL INVOLVEMENT IN CARE   This patient has a high probability of imminent, clinically significant deterioration, which requires the highest level of preparedness to intervene urgently. I participated in the decision-making and personally managed or directed the management of the following life and organ supporting interventions that required my frequent assessment to treat or prevent imminent deterioration. I personally spent 40 minutes of critical care time. This is time spent at this critically ill patient's bedside actively involved in patient care as well as the coordination of care and discussions with the patient's family.   This does not include any procedural time which has been billed separately.     Teresa Bolaños, DO   Staff Intensivist/Anesthesiologist  Critical Care Medicine

## 2022-02-24 NOTE — PROGRESS NOTES
Renal Progress Note    NAME:  Ladan Mazariegos   :   1970   MRN:   [id number]     Date/Time:  2022 11:19 AM      Assessment:   Hyponatremia (Severe) - likely sec to low/poor solute intake associated with alcohol abuse. (The serum osmo was 240, urine osmo 288, urine creat 44, urine Na 38). Hypertension  Alcohol Abuse - with withdrawal       Plan:   Mr Chika Dillon received 3 % saline in the ER yesterday. His serum sodium went from 111  --> 119 --> 113. The initial rate of correction is 4 to 6 meq/l/day. His symptoms now seem consistent with alcohol withdrawal (tremors,visual hallucinations) vs symptomatic hyponatremia. Will hold further doses of 3 % saline for now. Will start Ure NA --> 15 grams po BID (in an effort to increase free water clearance). Increase the solute intake of the diet. Hold lisinopril. Avoid HCTZ in the future. Continue q 2 to 3 hourly sodium monitoring. Management of alcohol withdrawal will be deferred to the ICU team.  Abstinence from alcohol was stressed yesterday. Subjective:       F/U Hyponatremia --> 22    His course was discussed with ICU Nursing. He was restless overnight. Insomnia and visual/auditory hallucinations were reported. A dose of DDAVP was given this morning in an effort \" to slow the rate of correction \".       Review of Systems:  Y  N       Y  N  []         []          Fever/chills                                               []         []          Chest Pain  []         []          Cough                                                       []         []          Diarrhea   []         []          Sputum                                                     []         []          Constipation  []         []          SOB/PERSAUD                                                []         []          Nausea/Vomit  []         []          Abd Pain                                                    []         []          Tolerating PT  [] []          Dysuria                                                      []         []          Tolerating Diet     []        Unable to obtain  ROS due to  []        mental status change  []        sedated   []        intubated    Medications reviewed:  Current Facility-Administered Medications   Medication Dose Route Frequency    thiamine HCL (B-1) tablet 200 mg  200 mg Oral DAILY    sodium chloride (NS) flush 5-40 mL  5-40 mL IntraVENous Q8H    sodium chloride (NS) flush 5-40 mL  5-40 mL IntraVENous PRN    sodium chloride (NS) flush 5-40 mL  5-40 mL IntraVENous Q8H    sodium chloride (NS) flush 5-40 mL  5-40 mL IntraVENous PRN    acetaminophen (TYLENOL) tablet 650 mg  650 mg Oral Q6H PRN    Or    acetaminophen (TYLENOL) suppository 650 mg  650 mg Rectal Q6H PRN    enoxaparin (LOVENOX) injection 40 mg  40 mg SubCUTAneous DAILY    ondansetron (ZOFRAN) injection 4 mg  4 mg IntraVENous Q6H PRN    bisacodyL (DULCOLAX) tablet 5 mg  5 mg Oral DAILY PRN    LORazepam (ATIVAN) tablet 1 mg  1 mg Oral Q1H PRN    LORazepam (ATIVAN) tablet 2 mg  2 mg Oral A7O PRN    folic acid (FOLVITE) tablet 1 mg  1 mg Oral DAILY    multivitamin, tx-iron-ca-min (THERA-M w/ IRON) tablet 1 Tablet  1 Tablet Oral DAILY    hydrALAZINE (APRESOLINE) 20 mg/mL injection 10-20 mg  10-20 mg IntraVENous Q6H PRN    melatonin tablet 9 mg  9 mg Oral QHS PRN        Objective:   Vitals:  Visit Vitals  BP (!) 134/94 (BP 1 Location: Right upper arm, BP Patient Position: At rest)   Pulse 82   Temp 97.8 °F (36.6 °C)   Resp 18   Ht 5' 8\" (1.727 m)   Wt 88.6 kg (195 lb 5.2 oz)   SpO2 100%   BMI 29.70 kg/m²     Temp (24hrs), Av.2 °F (36.8 °C), Min:97.2 °F (36.2 °C), Max:99 °F (37.2 °C)      O2 Device: None (Room air)    Last 24hr Input/Output:    Intake/Output Summary (Last 24 hours) at 2022 1119  Last data filed at 2022 0400  Gross per 24 hour   Intake 963.33 ml   Output 1875 ml   Net -911.67 ml        PHYSICAL EXAM:    Seen in ICU - Bed 18      General:    Alert,        Head:   Normocephalic     Lungs:   Clear to auscultation , No Wheezes , No rales. Heart:   No S3 gallop , No pericardial rub  . Abdomen:   Not distended. Skin   :              Facial erythema     Psych:  Anxious . Neurologic:      Responding to questions. Conversation was tangential at times. []        Telemetry Reviewed     []        NSR []        PAC/PVCs   []        Afib  []        Paced   []        NSVT   []        Westfall []        NGT  []        Intubated on vent    Lab Data Reviewed:    Recent Results (from the past 24 hour(s))   CBC WITH AUTOMATED DIFF    Collection Time: 02/23/22  1:03 PM   Result Value Ref Range    WBC 5.8 4.1 - 11.1 K/uL    RBC 4.39 4. 10 - 5.70 M/uL    HGB 14.7 12.1 - 17.0 g/dL    HCT 40.9 36.6 - 50.3 %    MCV 93.2 80.0 - 99.0 FL    MCH 33.5 26.0 - 34.0 PG    MCHC 35.9 30.0 - 36.5 g/dL    RDW 11.7 11.5 - 14.5 %    PLATELET 715 268 - 218 K/uL    MPV 8.1 (L) 8.9 - 12.9 FL    NRBC 0.0 0  WBC    ABSOLUTE NRBC 0.00 0.00 - 0.01 K/uL    NEUTROPHILS 74 32 - 75 %    LYMPHOCYTES 13 12 - 49 %    MONOCYTES 13 5 - 13 %    EOSINOPHILS 0 0 - 7 %    BASOPHILS 0 0 - 1 %    IMMATURE GRANULOCYTES 0 0.0 - 0.5 %    ABS. NEUTROPHILS 4.2 1.8 - 8.0 K/UL    ABS. LYMPHOCYTES 0.8 0.8 - 3.5 K/UL    ABS. MONOCYTES 0.8 0.0 - 1.0 K/UL    ABS. EOSINOPHILS 0.0 0.0 - 0.4 K/UL    ABS. BASOPHILS 0.0 0.0 - 0.1 K/UL    ABS. IMM.  GRANS. 0.0 0.00 - 0.04 K/UL    DF SMEAR SCANNED      RBC COMMENTS NORMOCYTIC, NORMOCHROMIC     METABOLIC PANEL, COMPREHENSIVE    Collection Time: 02/23/22  1:03 PM   Result Value Ref Range    Sodium 111 (LL) 136 - 145 mmol/L    Potassium 4.6 3.5 - 5.1 mmol/L    Chloride 76 (L) 97 - 108 mmol/L    CO2 27 21 - 32 mmol/L    Anion gap 8 5 - 15 mmol/L    Glucose 100 65 - 100 mg/dL    BUN 7 6 - 20 MG/DL    Creatinine 0.78 0.70 - 1.30 MG/DL    BUN/Creatinine ratio 9 (L) 12 - 20      GFR est AA >60 >60 ml/min/1.73m2    GFR est non-AA >60 >60 ml/min/1.73m2    Calcium 9.4 8.5 - 10.1 MG/DL    Bilirubin, total 0.9 0.2 - 1.0 MG/DL    ALT (SGPT) 74 12 - 78 U/L    AST (SGOT) 74 (H) 15 - 37 U/L    Alk. phosphatase 71 45 - 117 U/L    Protein, total 8.3 (H) 6.4 - 8.2 g/dL    Albumin 4.6 3.5 - 5.0 g/dL    Globulin 3.7 2.0 - 4.0 g/dL    A-G Ratio 1.2 1.1 - 2.2     SAMPLES BEING HELD    Collection Time: 02/23/22  1:03 PM   Result Value Ref Range    SAMPLES BEING HELD 1red,1blu     COMMENT        Add-on orders for these samples will be processed based on acceptable specimen integrity and analyte stability, which may vary by analyte.    URINALYSIS W/MICROSCOPIC    Collection Time: 02/23/22  1:03 PM   Result Value Ref Range    Color YELLOW/STRAW      Appearance CLEAR CLEAR      Specific gravity 1.009 1.003 - 1.030      pH (UA) 8.0 5.0 - 8.0      Protein Negative NEG mg/dL    Glucose Negative NEG mg/dL    Ketone 15 (A) NEG mg/dL    Bilirubin Negative NEG      Blood Negative NEG      Urobilinogen 1.0 0.2 - 1.0 EU/dL    Nitrites Negative NEG      Leukocyte Esterase Negative NEG      WBC 0-4 0 - 4 /hpf    RBC 0-5 0 - 5 /hpf    Epithelial cells FEW FEW /lpf    Bacteria Negative NEG /hpf    Hyaline cast 0-2 0 - 5 /lpf   OSMOLALITY, SERUM/PLASMA    Collection Time: 02/23/22  1:06 PM   Result Value Ref Range    Osmolality, serum/plasma 240 mOsm/kg H2O   EKG, 12 LEAD, INITIAL    Collection Time: 02/23/22  1:21 PM   Result Value Ref Range    Ventricular Rate 95 BPM    Atrial Rate 95 BPM    P-R Interval 174 ms    QRS Duration 88 ms    Q-T Interval 338 ms    QTC Calculation (Bezet) 424 ms    Calculated P Axis 67 degrees    Calculated R Axis -89 degrees    Calculated T Axis 48 degrees    Diagnosis       Normal sinus rhythm  Pulmonary disease pattern  Left anterior fascicular block  Abnormal ECG  No previous ECGs available     MAGNESIUM    Collection Time: 02/23/22  2:15 PM   Result Value Ref Range    Magnesium 2.0 1.6 - 2.4 mg/dL   SODIUM, UR, RANDOM    Collection Time: 02/23/22  2:15 PM   Result Value Ref Range    Sodium,urine random 38 MMOL/L   CREATININE, UR, RANDOM    Collection Time: 02/23/22  2:15 PM   Result Value Ref Range    Creatinine, urine 44.20 mg/dL   PROTEIN URINE, RANDOM    Collection Time: 02/23/22  2:15 PM   Result Value Ref Range    Protein, urine random 9 0.0 - 11.9 mg/dL   OSMOLALITY, UR    Collection Time: 02/23/22  2:18 PM   Result Value Ref Range    Osmolality,urine 288 MOSM/kg C4T   METABOLIC PANEL, BASIC    Collection Time: 02/23/22  3:16 PM   Result Value Ref Range    Sodium 112 (LL) 136 - 145 mmol/L    Potassium 4.2 3.5 - 5.1 mmol/L    Chloride 77 (L) 97 - 108 mmol/L    CO2 24 21 - 32 mmol/L    Anion gap 11 5 - 15 mmol/L    Glucose 99 65 - 100 mg/dL    BUN 6 6 - 20 MG/DL    Creatinine 0.68 (L) 0.70 - 1.30 MG/DL    BUN/Creatinine ratio 9 (L) 12 - 20      GFR est AA >60 >60 ml/min/1.73m2    GFR est non-AA >60 >60 ml/min/1.73m2    Calcium 8.9 8.5 - 10.1 MG/DL   PHOSPHORUS    Collection Time: 02/23/22  3:16 PM   Result Value Ref Range    Phosphorus 3.0 2.6 - 4.7 MG/DL   TSH 3RD GENERATION    Collection Time: 02/23/22  3:16 PM   Result Value Ref Range    TSH 0.49 0.36 - 3.74 uIU/mL   POTASSIUM, UR, RANDOM    Collection Time: 02/23/22  3:17 PM   Result Value Ref Range    Potassium urine, random 63 MMOL/L   SAMPLES BEING HELD    Collection Time: 02/23/22  3:17 PM   Result Value Ref Range    SAMPLES BEING HELD 1UA     COMMENT        Add-on orders for these samples will be processed based on acceptable specimen integrity and analyte stability, which may vary by analyte.    RENAL FUNCTION PANEL    Collection Time: 02/23/22  8:38 PM   Result Value Ref Range    Sodium 114 (LL) 136 - 145 mmol/L    Potassium 3.9 3.5 - 5.1 mmol/L    Chloride 82 (L) 97 - 108 mmol/L    CO2 23 21 - 32 mmol/L    Anion gap 9 5 - 15 mmol/L    Glucose 88 65 - 100 mg/dL    BUN 8 6 - 20 MG/DL    Creatinine 0.68 (L) 0.70 - 1.30 MG/DL    BUN/Creatinine ratio 12 12 - 20      GFR est AA >60 >60 ml/min/1.73m2    GFR est non-AA >60 >60 ml/min/1.73m2    Calcium 9.5 8.5 - 10.1 MG/DL    Phosphorus 3.2 2.6 - 4.7 MG/DL    Albumin 4.5 3.5 - 5.0 g/dL   RENAL FUNCTION PANEL    Collection Time: 02/24/22 12:32 AM   Result Value Ref Range    Sodium 115 (LL) 136 - 145 mmol/L    Potassium 3.5 3.5 - 5.1 mmol/L    Chloride 83 (L) 97 - 108 mmol/L    CO2 23 21 - 32 mmol/L    Anion gap 9 5 - 15 mmol/L    Glucose 110 (H) 65 - 100 mg/dL    BUN 8 6 - 20 MG/DL    Creatinine 0.65 (L) 0.70 - 1.30 MG/DL    BUN/Creatinine ratio 12 12 - 20      GFR est AA >60 >60 ml/min/1.73m2    GFR est non-AA >60 >60 ml/min/1.73m2    Calcium 9.4 8.5 - 10.1 MG/DL    Phosphorus 3.5 2.6 - 4.7 MG/DL    Albumin 4.4 3.5 - 5.0 g/dL   CBC W/O DIFF    Collection Time: 02/24/22  3:12 AM   Result Value Ref Range    WBC 5.2 4.1 - 11.1 K/uL    RBC 4.16 4.10 - 5.70 M/uL    HGB 14.2 12.1 - 17.0 g/dL    HCT 39.1 36.6 - 50.3 %    MCV 94.0 80.0 - 99.0 FL    MCH 34.1 (H) 26.0 - 34.0 PG    MCHC 36.3 30.0 - 36.5 g/dL    RDW 11.9 11.5 - 14.5 %    PLATELET 999 911 - 032 K/uL    MPV 8.6 (L) 8.9 - 12.9 FL    NRBC 0.0 0  WBC    ABSOLUTE NRBC 0.00 0.00 - 7.60 K/uL   METABOLIC PANEL, BASIC    Collection Time: 02/24/22  3:12 AM   Result Value Ref Range    Sodium 119 (LL) 136 - 145 mmol/L    Potassium 3.6 3.5 - 5.1 mmol/L    Chloride 85 (L) 97 - 108 mmol/L    CO2 24 21 - 32 mmol/L    Anion gap 10 5 - 15 mmol/L    Glucose 88 65 - 100 mg/dL    BUN 9 6 - 20 MG/DL    Creatinine 0.72 0.70 - 1.30 MG/DL    BUN/Creatinine ratio 13 12 - 20      GFR est AA >60 >60 ml/min/1.73m2    GFR est non-AA >60 >60 ml/min/1.73m2    Calcium 8.9 8.5 - 10.1 MG/DL   RENAL FUNCTION PANEL    Collection Time: 02/24/22  6:29 AM   Result Value Ref Range    Sodium 113 (LL) 136 - 145 mmol/L    Potassium 4.8 3.5 - 5.1 mmol/L    Chloride 88 (L) 97 - 108 mmol/L    CO2 18 (L) 21 - 32 mmol/L    Anion gap 7 5 - 15 mmol/L    Glucose 90 65 - 100 mg/dL    BUN 8 6 - 20 MG/DL    Creatinine 0.63 (L) 0.70 - 1.30 MG/DL    BUN/Creatinine ratio 13 12 - 20      GFR est AA >60 >60 ml/min/1.73m2    GFR est non-AA >60 >60 ml/min/1.73m2    Calcium 9.1 8.5 - 10.1 MG/DL    Phosphorus 3.8 2.6 - 4.7 MG/DL    Albumin 3.2 (L) 3.5 - 5.0 g/dL       Total time spent with patient:  []        15   []        25   []        35   []         __ minutes    []        Critical Care Provided    Care Plan discussed with:    D/W ICU Nursing      [x]        Patient   []        Family    []        Care Manager   []        Consultant/Specialist :      []          >50% of visit spent in counseling and coordination of care   (Discussed []        CODE status,  []        Care Plan, []        D/C Planning)    ___________________________________________________    Attending Physician: Nilson Pettit MD

## 2022-02-24 NOTE — PROGRESS NOTES
Reviewed chart for transitions of care, and discussed in rounds. CM met with patient at bedside to explain role and offer support. Patient is alert and oriented x4, and confirmed demographics. Baseline:   ADLs/IDALS:Independent  Previous Home Health: None  Previous SNF/IPR:None  ER Contact: Wife Rocco Johnson [phone]    Patient lives in a 2  level house with 10 steps to enter. Patient is independent with ADLs/IDALs. Patient has no previous HH or equipment needs. . Patient's preferred pharmacy is [facility]  located at [employer]. Patient's wife is expected to transport at discharge. Reason for Admission:  Hyponatremia                     RUR Score:     5%                Plan for utilizing home health:   No       PCP: First and Last name:  Dr Evelina Blake     Name of Practice:    Are you a current patient: Yes/No: Yes   Approximate date of last visit: 2/22/22   Can you participate in a virtual visit with your PCP: Yes                    Current Advanced Directive/Advance Care Plan: Full Code      Healthcare Decision Maker:   Click here to complete [address] including selection of the Healthcare Decision Maker Relationship (ie \"Primary\")                             Transition of Care Plan:         Patient presented to the ED with dizziness and decreased appetite. Na+ 113, 3%NACL started. Care manager met with patient and his wife Rocco Johnson [phone] to introduce self and explain role. Patient is completely independent. CM confirmed demographic information. Will follow for transitions of care. Jerry Desouza RN,Care Management   Care Management Interventions  PCP Verified by CM:  Yes (Dr Evelina Blake)  [address] Jazielmital Bakaydoe: No  Discharge Durable Medical Equipment: No  Physical Therapy Consult: No  Occupational Therapy Consult: No  Speech Therapy Consult: No  Support Systems: Spouse/Significant Other (wife Rocco Johnson [phone])  Confirm Follow Up Transport: Family  Discharge Location  Patient Expects to be Discharged to[de-identified] Home with family assistance

## 2022-02-25 LAB
ALBUMIN SERPL-MCNC: 3.8 G/DL (ref 3.5–5)
ALBUMIN SERPL-MCNC: 3.8 G/DL (ref 3.5–5)
ALBUMIN SERPL-MCNC: 3.9 G/DL (ref 3.5–5)
ALBUMIN SERPL-MCNC: 4 G/DL (ref 3.5–5)
ALBUMIN SERPL-MCNC: 4.2 G/DL (ref 3.5–5)
ANION GAP SERPL CALC-SCNC: 3 MMOL/L (ref 5–15)
ANION GAP SERPL CALC-SCNC: 5 MMOL/L (ref 5–15)
ANION GAP SERPL CALC-SCNC: 7 MMOL/L (ref 5–15)
ANION GAP SERPL CALC-SCNC: 7 MMOL/L (ref 5–15)
ANION GAP SERPL CALC-SCNC: 8 MMOL/L (ref 5–15)
ATRIAL RATE: 95 BPM
BUN SERPL-MCNC: 19 MG/DL (ref 6–20)
BUN SERPL-MCNC: 20 MG/DL (ref 6–20)
BUN SERPL-MCNC: 22 MG/DL (ref 6–20)
BUN SERPL-MCNC: 25 MG/DL (ref 6–20)
BUN SERPL-MCNC: 34 MG/DL (ref 6–20)
BUN/CREAT SERPL: 26 (ref 12–20)
BUN/CREAT SERPL: 30 (ref 12–20)
BUN/CREAT SERPL: 31 (ref 12–20)
BUN/CREAT SERPL: 38 (ref 12–20)
BUN/CREAT SERPL: 43 (ref 12–20)
CALCIUM SERPL-MCNC: 9.2 MG/DL (ref 8.5–10.1)
CALCIUM SERPL-MCNC: 9.3 MG/DL (ref 8.5–10.1)
CALCIUM SERPL-MCNC: 9.4 MG/DL (ref 8.5–10.1)
CALCULATED P AXIS, ECG09: 67 DEGREES
CALCULATED R AXIS, ECG10: -89 DEGREES
CALCULATED T AXIS, ECG11: 48 DEGREES
CHLORIDE SERPL-SCNC: 88 MMOL/L (ref 97–108)
CHLORIDE SERPL-SCNC: 90 MMOL/L (ref 97–108)
CHLORIDE SERPL-SCNC: 90 MMOL/L (ref 97–108)
CHLORIDE SERPL-SCNC: 91 MMOL/L (ref 97–108)
CHLORIDE SERPL-SCNC: 91 MMOL/L (ref 97–108)
CO2 SERPL-SCNC: 24 MMOL/L (ref 21–32)
CO2 SERPL-SCNC: 25 MMOL/L (ref 21–32)
CO2 SERPL-SCNC: 25 MMOL/L (ref 21–32)
CO2 SERPL-SCNC: 26 MMOL/L (ref 21–32)
CO2 SERPL-SCNC: 29 MMOL/L (ref 21–32)
CREAT SERPL-MCNC: 0.65 MG/DL (ref 0.7–1.3)
CREAT SERPL-MCNC: 0.66 MG/DL (ref 0.7–1.3)
CREAT SERPL-MCNC: 0.7 MG/DL (ref 0.7–1.3)
CREAT SERPL-MCNC: 0.72 MG/DL (ref 0.7–1.3)
CREAT SERPL-MCNC: 0.8 MG/DL (ref 0.7–1.3)
DIAGNOSIS, 93000: NORMAL
ERYTHROCYTE [DISTWIDTH] IN BLOOD BY AUTOMATED COUNT: 12.7 % (ref 11.5–14.5)
GLUCOSE SERPL-MCNC: 104 MG/DL (ref 65–100)
GLUCOSE SERPL-MCNC: 105 MG/DL (ref 65–100)
GLUCOSE SERPL-MCNC: 116 MG/DL (ref 65–100)
GLUCOSE SERPL-MCNC: 118 MG/DL (ref 65–100)
GLUCOSE SERPL-MCNC: 99 MG/DL (ref 65–100)
HCT VFR BLD AUTO: 39 % (ref 36.6–50.3)
HGB BLD-MCNC: 13.8 G/DL (ref 12.1–17)
MAGNESIUM SERPL-MCNC: 2.4 MG/DL (ref 1.6–2.4)
MCH RBC QN AUTO: 34 PG (ref 26–34)
MCHC RBC AUTO-ENTMCNC: 35.4 G/DL (ref 30–36.5)
MCV RBC AUTO: 96.1 FL (ref 80–99)
NRBC # BLD: 0 K/UL (ref 0–0.01)
NRBC BLD-RTO: 0 PER 100 WBC
P-R INTERVAL, ECG05: 174 MS
PHOSPHATE SERPL-MCNC: 3.2 MG/DL (ref 2.6–4.7)
PHOSPHATE SERPL-MCNC: 3.5 MG/DL (ref 2.6–4.7)
PHOSPHATE SERPL-MCNC: 3.9 MG/DL (ref 2.6–4.7)
PHOSPHATE SERPL-MCNC: 4.2 MG/DL (ref 2.6–4.7)
PHOSPHATE SERPL-MCNC: 4.3 MG/DL (ref 2.6–4.7)
PLATELET # BLD AUTO: 243 K/UL (ref 150–400)
PMV BLD AUTO: 8.6 FL (ref 8.9–12.9)
POTASSIUM SERPL-SCNC: 3.4 MMOL/L (ref 3.5–5.1)
POTASSIUM SERPL-SCNC: 3.7 MMOL/L (ref 3.5–5.1)
POTASSIUM SERPL-SCNC: 3.9 MMOL/L (ref 3.5–5.1)
POTASSIUM SERPL-SCNC: 4.2 MMOL/L (ref 3.5–5.1)
POTASSIUM SERPL-SCNC: 4.2 MMOL/L (ref 3.5–5.1)
Q-T INTERVAL, ECG07: 338 MS
QRS DURATION, ECG06: 88 MS
QTC CALCULATION (BEZET), ECG08: 424 MS
RBC # BLD AUTO: 4.06 M/UL (ref 4.1–5.7)
SODIUM SERPL-SCNC: 120 MMOL/L (ref 136–145)
SODIUM SERPL-SCNC: 122 MMOL/L (ref 136–145)
SODIUM SERPL-SCNC: 123 MMOL/L (ref 136–145)
VENTRICULAR RATE, ECG03: 95 BPM
WBC # BLD AUTO: 7.1 K/UL (ref 4.1–11.1)

## 2022-02-25 PROCEDURE — 74011000250 HC RX REV CODE- 250: Performed by: NURSE PRACTITIONER

## 2022-02-25 PROCEDURE — 80069 RENAL FUNCTION PANEL: CPT

## 2022-02-25 PROCEDURE — 74011250636 HC RX REV CODE- 250/636: Performed by: NURSE PRACTITIONER

## 2022-02-25 PROCEDURE — 85027 COMPLETE CBC AUTOMATED: CPT

## 2022-02-25 PROCEDURE — 65610000006 HC RM INTENSIVE CARE

## 2022-02-25 PROCEDURE — 77010033678 HC OXYGEN DAILY

## 2022-02-25 PROCEDURE — 74011250637 HC RX REV CODE- 250/637: Performed by: ANESTHESIOLOGY

## 2022-02-25 PROCEDURE — 74011250637 HC RX REV CODE- 250/637: Performed by: NURSE PRACTITIONER

## 2022-02-25 PROCEDURE — 83735 ASSAY OF MAGNESIUM: CPT

## 2022-02-25 PROCEDURE — 36415 COLL VENOUS BLD VENIPUNCTURE: CPT

## 2022-02-25 PROCEDURE — 74011250637 HC RX REV CODE- 250/637: Performed by: INTERNAL MEDICINE

## 2022-02-25 PROCEDURE — 74011000250 HC RX REV CODE- 250: Performed by: EMERGENCY MEDICINE

## 2022-02-25 RX ADMIN — LORAZEPAM 2 MG: 1 TABLET ORAL at 23:18

## 2022-02-25 RX ADMIN — HYDRALAZINE HYDROCHLORIDE 10 MG: 20 INJECTION, SOLUTION INTRAMUSCULAR; INTRAVENOUS at 20:17

## 2022-02-25 RX ADMIN — SODIUM CHLORIDE, PRESERVATIVE FREE 10 ML: 5 INJECTION INTRAVENOUS at 05:56

## 2022-02-25 RX ADMIN — Medication 1 PACKET: at 17:12

## 2022-02-25 RX ADMIN — ENOXAPARIN SODIUM 40 MG: 100 INJECTION SUBCUTANEOUS at 08:15

## 2022-02-25 RX ADMIN — Medication 1 TABLET: at 08:15

## 2022-02-25 RX ADMIN — LORAZEPAM 2 MG: 1 TABLET ORAL at 08:15

## 2022-02-25 RX ADMIN — SODIUM CHLORIDE, PRESERVATIVE FREE 20 ML: 5 INJECTION INTRAVENOUS at 22:00

## 2022-02-25 RX ADMIN — FOLIC ACID 1 MG: 1 TABLET ORAL at 08:15

## 2022-02-25 RX ADMIN — SODIUM CHLORIDE, PRESERVATIVE FREE 10 ML: 5 INJECTION INTRAVENOUS at 17:15

## 2022-02-25 RX ADMIN — Medication 200 MG: at 08:15

## 2022-02-25 RX ADMIN — Medication 1 PACKET: at 08:15

## 2022-02-25 RX ADMIN — Medication 9 MG: at 23:18

## 2022-02-25 NOTE — PROGRESS NOTES
Renal Progress Note    NAME:  Lonnie Bustillos   :   1970   MRN:   [id number]     Date/Time:  2022       Assessment:   Hyponatremia (Severe) - likely sec to low/poor solute intake associated with alcohol abuse. (The serum osmo was 240, urine osmo 288, urine creat 44, urine Na 38). Hypertension  Alcohol Abuse - with withdrawal       Plan:   Mr Nichole Glass received 3 % saline in the ER on . His serum sodium went from 111 (at 13:03 on )  --> 122 (at 8:29 on 22). The recommended rate of correction is 4 to 6 meq/l/day. So far, he is on track . His symptoms now seem consistent with alcohol withdrawal (tremors,visual hallucinations) vs symptomatic hyponatremia. Will hold further doses of 3 % saline for now. Continue Ure NA --> 15 grams po BID (in an effort to increase free water clearance). Increase the solute intake of the diet. Hold lisinopril. Avoid HCTZ in the future. Adjust fluid restriction to 1200 mls per day  His serum sodium is now above 120. Will adjust sodium monitoring to q 8 hourly. Management of alcohol withdrawal will be deferred to the ICU team.  Abstinence from alcohol was stressed on Wed ()           Subjective:       F/U Hyponatremia --> 22    His course was discussed with ICU Nursing. He was restless overnight. Insomnia and visual/auditory hallucinations were reported. A dose of DDAVP was given this morning in an effort \" to slow the rate of correction \".    F/U Hyponatremia --> 22    Mr Nichole Glass felt better.       Review of Systems:  Y  N       Y  N  []         []          Fever/chills                                               []         []          Chest Pain  []         []          Cough                                                       []         []          Diarrhea   []         []          Sputum                                                     []         []          Constipation  []         []          SOB/PERSAUD []         []          Nausea/Vomit  []         []          Abd Pain                                                    []         []          Tolerating PT  []         []          Dysuria                                                      []         []          Tolerating Diet     []        Unable to obtain  ROS due to  []        mental status change  []        sedated   []        intubated    Medications reviewed:  Current Facility-Administered Medications   Medication Dose Route Frequency    thiamine HCL (B-1) tablet 200 mg  200 mg Oral DAILY    urea (URE-NA) 15 gram packet 1 Packet  1 Packet Oral BID    sodium chloride (NS) flush 5-40 mL  5-40 mL IntraVENous Q8H    sodium chloride (NS) flush 5-40 mL  5-40 mL IntraVENous PRN    sodium chloride (NS) flush 5-40 mL  5-40 mL IntraVENous Q8H    sodium chloride (NS) flush 5-40 mL  5-40 mL IntraVENous PRN    acetaminophen (TYLENOL) tablet 650 mg  650 mg Oral Q6H PRN    Or    acetaminophen (TYLENOL) suppository 650 mg  650 mg Rectal Q6H PRN    enoxaparin (LOVENOX) injection 40 mg  40 mg SubCUTAneous DAILY    ondansetron (ZOFRAN) injection 4 mg  4 mg IntraVENous Q6H PRN    bisacodyL (DULCOLAX) tablet 5 mg  5 mg Oral DAILY PRN    LORazepam (ATIVAN) tablet 1 mg  1 mg Oral Q1H PRN    LORazepam (ATIVAN) tablet 2 mg  2 mg Oral K8W PRN    folic acid (FOLVITE) tablet 1 mg  1 mg Oral DAILY    multivitamin, tx-iron-ca-min (THERA-M w/ IRON) tablet 1 Tablet  1 Tablet Oral DAILY    hydrALAZINE (APRESOLINE) 20 mg/mL injection 10-20 mg  10-20 mg IntraVENous Q6H PRN    melatonin tablet 9 mg  9 mg Oral QHS PRN        Objective:   Vitals:  Visit Vitals  BP (!) 152/99   Pulse 78   Temp 97.7 °F (36.5 °C)   Resp 22   Ht 5' 8\" (1.727 m)   Wt 88.6 kg (195 lb 5.2 oz)   SpO2 97%   BMI 29.70 kg/m²     Temp (24hrs), Av.7 °F (36.5 °C), Min:97.2 °F (36.2 °C), Max:98.2 °F (36.8 °C)    O2 Flow Rate (L/min): 2 l/min O2 Device: None (Room air)    Last 24hr Input/Output:    Intake/Output Summary (Last 24 hours) at 2/25/2022 1211  Last data filed at 2/25/2022 1000  Gross per 24 hour   Intake 1335 ml   Output 1425 ml   Net -90 ml        PHYSICAL EXAM:    Seen in ICU - Bed 18      General:    Alert,        Head:   Normocephalic     Lungs:   Clear to auscultation , No Wheezes , No rales. Heart:   No S3 gallop , No pericardial rub  . Abdomen:   Not distended. Skin   :              Facial erythema     Psych:  Seemed more composed today. Neurologic:      Responding to questions. Conversation was appropriate this afternoon.         []        Telemetry Reviewed     []        NSR []        PAC/PVCs   []        Afib  []        Paced   []        NSVT   []        Westfall []        NGT  []        Intubated on vent    Lab Data Reviewed:    Recent Results (from the past 24 hour(s))   SODIUM    Collection Time: 02/24/22  2:53 PM   Result Value Ref Range    Sodium 121 (L) 136 - 145 mmol/L   RENAL FUNCTION PANEL    Collection Time: 02/24/22  6:39 PM   Result Value Ref Range    Sodium 120 (L) 136 - 145 mmol/L    Potassium 3.8 3.5 - 5.1 mmol/L    Chloride 88 (L) 97 - 108 mmol/L    CO2 23 21 - 32 mmol/L    Anion gap 9 5 - 15 mmol/L    Glucose 113 (H) 65 - 100 mg/dL    BUN 20 6 - 20 MG/DL    Creatinine 0.87 0.70 - 1.30 MG/DL    BUN/Creatinine ratio 23 (H) 12 - 20      GFR est AA >60 >60 ml/min/1.73m2    GFR est non-AA >60 >60 ml/min/1.73m2    Calcium 9.4 8.5 - 10.1 MG/DL    Phosphorus 2.7 2.6 - 4.7 MG/DL    Albumin 4.2 3.5 - 5.0 g/dL   RENAL FUNCTION PANEL    Collection Time: 02/24/22  9:17 PM   Result Value Ref Range    Sodium 119 (LL) 136 - 145 mmol/L    Potassium 3.8 3.5 - 5.1 mmol/L    Chloride 89 (L) 97 - 108 mmol/L    CO2 23 21 - 32 mmol/L    Anion gap 7 5 - 15 mmol/L    Glucose 121 (H) 65 - 100 mg/dL    BUN 32 (H) 6 - 20 MG/DL    Creatinine 0.71 0.70 - 1.30 MG/DL    BUN/Creatinine ratio 45 (H) 12 - 20      GFR est AA >60 >60 ml/min/1.73m2    GFR est non-AA >60 >60 ml/min/1.73m2    Calcium 9.5 8.5 - 10.1 MG/DL    Phosphorus 2.9 2.6 - 4.7 MG/DL    Albumin 4.2 3.5 - 5.0 g/dL   RENAL FUNCTION PANEL    Collection Time: 02/25/22 12:06 AM   Result Value Ref Range    Sodium 120 (L) 136 - 145 mmol/L    Potassium 3.4 (L) 3.5 - 5.1 mmol/L    Chloride 88 (L) 97 - 108 mmol/L    CO2 25 21 - 32 mmol/L    Anion gap 7 5 - 15 mmol/L    Glucose 118 (H) 65 - 100 mg/dL    BUN 25 (H) 6 - 20 MG/DL    Creatinine 0.65 (L) 0.70 - 1.30 MG/DL    BUN/Creatinine ratio 38 (H) 12 - 20      GFR est AA >60 >60 ml/min/1.73m2    GFR est non-AA >60 >60 ml/min/1.73m2    Calcium 9.2 8.5 - 10.1 MG/DL    Phosphorus 3.2 2.6 - 4.7 MG/DL    Albumin 3.8 3.5 - 5.0 g/dL   RENAL FUNCTION PANEL    Collection Time: 02/25/22  3:00 AM   Result Value Ref Range    Sodium 122 (L) 136 - 145 mmol/L    Potassium 3.7 3.5 - 5.1 mmol/L    Chloride 90 (L) 97 - 108 mmol/L    CO2 24 21 - 32 mmol/L    Anion gap 8 5 - 15 mmol/L    Glucose 104 (H) 65 - 100 mg/dL    BUN 22 (H) 6 - 20 MG/DL    Creatinine 0.70 0.70 - 1.30 MG/DL    BUN/Creatinine ratio 31 (H) 12 - 20      GFR est AA >60 >60 ml/min/1.73m2    GFR est non-AA >60 >60 ml/min/1.73m2    Calcium 9.2 8.5 - 10.1 MG/DL    Phosphorus 3.9 2.6 - 4.7 MG/DL    Albumin 3.8 3.5 - 5.0 g/dL   CBC W/O DIFF    Collection Time: 02/25/22  3:00 AM   Result Value Ref Range    WBC 7.1 4.1 - 11.1 K/uL    RBC 4.06 (L) 4.10 - 5.70 M/uL    HGB 13.8 12.1 - 17.0 g/dL    HCT 39.0 36.6 - 50.3 %    MCV 96.1 80.0 - 99.0 FL    MCH 34.0 26.0 - 34.0 PG    MCHC 35.4 30.0 - 36.5 g/dL    RDW 12.7 11.5 - 14.5 %    PLATELET 402 323 - 833 K/uL    MPV 8.6 (L) 8.9 - 12.9 FL    NRBC 0.0 0  WBC    ABSOLUTE NRBC 0.00 0.00 - 0.01 K/uL   MAGNESIUM    Collection Time: 02/25/22  3:00 AM   Result Value Ref Range    Magnesium 2.4 1.6 - 2.4 mg/dL   RENAL FUNCTION PANEL    Collection Time: 02/25/22  5:55 AM   Result Value Ref Range    Sodium 122 (L) 136 - 145 mmol/L    Potassium 3.9 3.5 - 5.1 mmol/L    Chloride 90 (L) 97 - 108 mmol/L    CO2 25 21 - 32 mmol/L    Anion gap 7 5 - 15 mmol/L    Glucose 105 (H) 65 - 100 mg/dL    BUN 20 6 - 20 MG/DL    Creatinine 0.66 (L) 0.70 - 1.30 MG/DL    BUN/Creatinine ratio 30 (H) 12 - 20      GFR est AA >60 >60 ml/min/1.73m2    GFR est non-AA >60 >60 ml/min/1.73m2    Calcium 9.3 8.5 - 10.1 MG/DL    Phosphorus 4.2 2.6 - 4.7 MG/DL    Albumin 3.9 3.5 - 5.0 g/dL   RENAL FUNCTION PANEL    Collection Time: 02/25/22  8:29 AM   Result Value Ref Range    Sodium 122 (L) 136 - 145 mmol/L    Potassium 4.2 3.5 - 5.1 mmol/L    Chloride 91 (L) 97 - 108 mmol/L    CO2 26 21 - 32 mmol/L    Anion gap 5 5 - 15 mmol/L    Glucose 99 65 - 100 mg/dL    BUN 19 6 - 20 MG/DL    Creatinine 0.72 0.70 - 1.30 MG/DL    BUN/Creatinine ratio 26 (H) 12 - 20      GFR est AA >60 >60 ml/min/1.73m2    GFR est non-AA >60 >60 ml/min/1.73m2    Calcium 9.4 8.5 - 10.1 MG/DL    Phosphorus 4.3 2.6 - 4.7 MG/DL    Albumin 4.2 3.5 - 5.0 g/dL       Total time spent with patient:  []        15   []        25   []        35   []         __ minutes    []        Critical Care Provided    Care Plan discussed with:          [x]        Patient   [x]        Family    []        Care Manager   []        Consultant/Specialist :      []          >50% of visit spent in counseling and coordination of care   (Discussed []        CODE status,  []        Care Plan, []        D/C Planning)    ___________________________________________________    Attending Physician: Price Bueno MD

## 2022-02-25 NOTE — PROGRESS NOTES
Bedside and Verbal shift change report given to [address] (oncoming nurse) by Jhon Dobson (offgoing nurse). Report included the following information Intake/Output, Recent Results, Cardiac Rhythm SR and Quality Measures       Rec'd resting in bed, easy to awaken. Voiced no C/O pain or discomfort. Assessment done and documented. .     Watching TV wife at bedside voiced no C/O voiding in   urinal.       Bedside shift change report given to Alex (oncoming nurse) by [address] (offgoing nurse). Report included the following information SBAR, Kardex, Intake/Output, Recent Results, Cardiac Rhythm SR and Quality Measures.

## 2022-02-25 NOTE — PROGRESS NOTES
The latest lytes were reviewed :    Sodium 120    K 3.8     BUN 20     Creat 0.87    Will discontinue 1/2 Normal Saline. Continue q 3 hourly sodium monitoring.

## 2022-02-25 NOTE — PROGRESS NOTES
02/25/22 0049   Oxygen Therapy   O2 Sat (%) 99 %   Pulse via Oximetry 82 beats per minute   O2 Device Nasal cannula   O2 Flow Rate (L/min) 2 l/min   Pre-Treatment   Breathing Pattern Irregular; Other (comment)  (snoring)   Respirations 17     Patient noted with significant snoring with strong suspicion of MARIE

## 2022-02-25 NOTE — PROGRESS NOTES
[facility]    ICU TEAM Progress Note    Name: Estela Balderas   : 1970   MRN: [id number]   Date: 2022      I  Subjective:   Progress Note: 2022      Reason for ICU Admission: Hyponatremia, alcohol withdrawal    Interval history:  49year old male with a PMH of HTN, anxiety, and alcohol abuse. He presented to [facility] ED for weakness/dizziness. He stated that 1 week ago his appetite declined and throughout the week his inappetance continued while he hydrated with approximately 5 water bottles per day in addition to the 12 cans of beer he drinks at night. Patient states he suffers from insomnia and anxiety and he uses the beer to self medicate at night. Neither him nor his wife seemed to think this was a problem since he never appeared drunk, woke up hung over, or missed any days of work due to his drinking. There have been several falls at home recently. He currently takes Lisinopril and Hydrochlorothiazide for his HTN and reports he sees his physician every 6 months to have this evaluated. His chemistry revealed significant hyponatremia at 111. He received a 3% sodium chloride bolus with frequent monitoring of his sodium levels. He was hypertensive as well. Critical care was consulted and patient will be transferred to the ICU for monitoring while his sodium levels are corrected. Patient did note that he had a similar episode in January with lower sodium levels and his physician had recommended gatorade to help replenish his electrolytes. Overnight Events:   No acute event. Off IV fluid. Active Problem List:     Problem List  Never Reviewed          Codes Class    Hyponatremia ICD-10-CM: E87.1  ICD-9-CM: 276.1               Past Medical History:      has no past medical history on file. Past Surgical History:      has no past surgical history on file.     Home Medications:     Prior to Admission medications    Not on File       Allergies/Social/Family History:     No Known Allergies Social History     Tobacco Use    Smoking status: Not on file    Smokeless tobacco: Not on file   Substance Use Topics    Alcohol use: Not on file      No family history on file. Review of Systems:     10 system reviewed, occasional hallucination, occasional tremor,    Objective:   Vital Signs:  Visit Vitals  BP (!) 154/102   Pulse 72   Temp 97.9 °F (36.6 °C)   Resp 14   Ht 5' 8\" (1.727 m)   Wt 88.6 kg (195 lb 5.2 oz)   SpO2 97%   BMI 29.70 kg/m²    O2 Flow Rate (L/min): 2 l/min O2 Device: None (Room air) Temp (24hrs), Av.8 °F (36.6 °C), Min:97.2 °F (36.2 °C), Max:98.2 °F (36.8 °C)           Intake/Output:     Intake/Output Summary (Last 24 hours) at 2022 1250  Last data filed at 2022 1000  Gross per 24 hour   Intake 1335 ml   Output 1425 ml   Net -90 ml       Physical Exam:    General:  alert, cooperative, anxious, appears stated age  Eye:  Both Pupils round and reactive, EOM intact. Normal gaze. Neurologic:  No focal deficit. Slight tremor in hands. Lungs:  clear to auscultation bilaterally  Heart:  regular rate and rhythm, S1, S2 normal, no murmur, click, rub or gallop  Abdomen:  soft, non-tender. Bowel sounds normal. No masses,  no organomegaly  Cardiovascular:  pedal pulses normal and no edema  Skin:  no rash or abnormalities       LABS AND  DATA: Personally reviewed  Recent Labs     22  0300 22  0312   WBC 7.1 5.2   HGB 13.8 14.2   HCT 39.0 39.1    243     Recent Labs     22  0829 22  0555 22  0300 22  0300 22  1516 22  1415   * 122*   < > 122*   < >  --    K 4.2 3.9   < > 3.7   < >  --    CL 91* 90*   < > 90*   < >  --    CO2 26 25   < > 24   < >  --    BUN 19 20   < > 22*   < >  --    CREA 0.72 0.66*   < > 0.70   < >  --    GLU 99 105*   < > 104*   < >  --    CA 9.4 9.3   < > 9.2   < >  --    MG  --   --   --  2.4  --  2.0   PHOS 4.3 4.2   < > 3.9   < >  --     < > = values in this interval not displayed.      Recent Labs 02/25/22  0829 02/25/22  0555 02/23/22 2038 02/23/22  1303   AP  --   --   --  71   TP  --   --   --  8.3*   ALB 4.2 3.9   < > 4.6   GLOB  --   --   --  3.7    < > = values in this interval not displayed. No results for input(s): INR, PTP, APTT, INREXT in the last 72 hours. No results for input(s): PHI, PCO2I, PO2I, FIO2I in the last 72 hours. No results for input(s): CPK, CKMB, TROIQ, BNPP in the last 72 hours. Hemodynamics:   PAP:   CO:     Wedge:   CI:     CVP:    SVR:       PVR:       Ventilator Settings:  Mode Rate Tidal Volume Pressure FiO2 PEEP                    Peak airway pressure:      Minute ventilation:          MEDS: Reviewed    Chest X-Ray:  CXR Results  (Last 48 hours)    None          Assessment and Plan:   -Hyponatremia:  -Alcohol abuse with early alcohol withdrawal:  -Hypertension:    -Appreciate nephrology, currently off IV fluid, tolerating oral diet. Continue to monitor sodium.  -Mild withdrawal symptoms. Patient never had a period of time where he stopped drinking completely so he is not aware of previous withdrawal symptoms. He required few doses of Ativan as needed. I will monitor him on today in the unit as his last drink was about 2 days ago. -Avoid hydrochlorothiazide in the future    DISPOSITION  Stay in ICU    CRITICAL CARE CONSULTANT NOTE  I had a face to face encounter with the patient, reviewed and interpreted patient data including clinical events, labs, images, vital signs, I/O's, and examined patient. I have discussed the case and the plan and management of the patient's care with the consulting services, the bedside nurses and the respiratory therapist.      NOTE OF PERSONAL INVOLVEMENT IN CARE   This patient has a high probability of imminent, clinically significant deterioration, which requires the highest level of preparedness to intervene urgently.  I participated in the decision-making and personally managed or directed the management of the following life and organ supporting interventions that required my frequent assessment to treat or prevent imminent deterioration. I personally spent 40 minutes of critical care time. This is time spent at this critically ill patient's bedside actively involved in patient care as well as the coordination of care and discussions with the patient's family. This does not include any procedural time which has been billed separately. Jessika Adhikari M.D.   Staff Intensivist/Pulmonologist  [facility]  2/25/2022

## 2022-02-25 NOTE — PROGRESS NOTES
1930: Bedside shift change report given to Janie Winter RN (oncoming nurse) by Johnny Varela RN (offgoing nurse). Report included the following information SBAR, Kardex, Intake/Output, MAR, Recent Results and Cardiac Rhythm SInus tach. 2225: Na 119. Rip Ragnel NP notified. No new orders received. 2345: Pt O2 sats in the 60s due to significant sleep apnea. Patient awakened and O2 went back to high 90s. Nasal cannula @ 2L placed on patient for the night. 0730: Bedside shift change report given to Earl Wilder RN (oncoming nurse) by Janie Winter RN (offgoing nurse). Report included the following information SBAR, Kardex, Intake/Output, MAR, Recent Results, Cardiac Rhythm NSR and Alarm Parameters .

## 2022-02-26 LAB
ALBUMIN SERPL-MCNC: 3.8 G/DL (ref 3.5–5)
ANION GAP SERPL CALC-SCNC: 4 MMOL/L (ref 5–15)
ANION GAP SERPL CALC-SCNC: 4 MMOL/L (ref 5–15)
ANION GAP SERPL CALC-SCNC: 5 MMOL/L (ref 5–15)
BUN SERPL-MCNC: 22 MG/DL (ref 6–20)
BUN SERPL-MCNC: 31 MG/DL (ref 6–20)
BUN SERPL-MCNC: 31 MG/DL (ref 6–20)
BUN/CREAT SERPL: 31 (ref 12–20)
BUN/CREAT SERPL: 34 (ref 12–20)
BUN/CREAT SERPL: 38 (ref 12–20)
CALCIUM SERPL-MCNC: 9.4 MG/DL (ref 8.5–10.1)
CALCIUM SERPL-MCNC: 9.4 MG/DL (ref 8.5–10.1)
CALCIUM SERPL-MCNC: 9.5 MG/DL (ref 8.5–10.1)
CHLORIDE SERPL-SCNC: 93 MMOL/L (ref 97–108)
CHLORIDE SERPL-SCNC: 93 MMOL/L (ref 97–108)
CHLORIDE SERPL-SCNC: 96 MMOL/L (ref 97–108)
CO2 SERPL-SCNC: 25 MMOL/L (ref 21–32)
CO2 SERPL-SCNC: 25 MMOL/L (ref 21–32)
CO2 SERPL-SCNC: 27 MMOL/L (ref 21–32)
CREAT SERPL-MCNC: 0.71 MG/DL (ref 0.7–1.3)
CREAT SERPL-MCNC: 0.81 MG/DL (ref 0.7–1.3)
CREAT SERPL-MCNC: 0.92 MG/DL (ref 0.7–1.3)
GLUCOSE SERPL-MCNC: 115 MG/DL (ref 65–100)
GLUCOSE SERPL-MCNC: 121 MG/DL (ref 65–100)
GLUCOSE SERPL-MCNC: 128 MG/DL (ref 65–100)
PHOSPHATE SERPL-MCNC: 3.9 MG/DL (ref 2.6–4.7)
POTASSIUM SERPL-SCNC: 3.9 MMOL/L (ref 3.5–5.1)
POTASSIUM SERPL-SCNC: 3.9 MMOL/L (ref 3.5–5.1)
POTASSIUM SERPL-SCNC: 4.2 MMOL/L (ref 3.5–5.1)
SODIUM SERPL-SCNC: 123 MMOL/L (ref 136–145)
SODIUM SERPL-SCNC: 124 MMOL/L (ref 136–145)
SODIUM SERPL-SCNC: 125 MMOL/L (ref 136–145)

## 2022-02-26 PROCEDURE — 74011250637 HC RX REV CODE- 250/637: Performed by: INTERNAL MEDICINE

## 2022-02-26 PROCEDURE — 74011000250 HC RX REV CODE- 250: Performed by: EMERGENCY MEDICINE

## 2022-02-26 PROCEDURE — 74011250637 HC RX REV CODE- 250/637: Performed by: ANESTHESIOLOGY

## 2022-02-26 PROCEDURE — 36415 COLL VENOUS BLD VENIPUNCTURE: CPT

## 2022-02-26 PROCEDURE — 80069 RENAL FUNCTION PANEL: CPT

## 2022-02-26 PROCEDURE — 65660000001 HC RM ICU INTERMED STEPDOWN

## 2022-02-26 PROCEDURE — 80048 BASIC METABOLIC PNL TOTAL CA: CPT

## 2022-02-26 PROCEDURE — 74011000250 HC RX REV CODE- 250: Performed by: NURSE PRACTITIONER

## 2022-02-26 PROCEDURE — 74011250637 HC RX REV CODE- 250/637: Performed by: NURSE PRACTITIONER

## 2022-02-26 PROCEDURE — 93005 ELECTROCARDIOGRAM TRACING: CPT

## 2022-02-26 PROCEDURE — 74011250636 HC RX REV CODE- 250/636: Performed by: NURSE PRACTITIONER

## 2022-02-26 RX ADMIN — SODIUM CHLORIDE, PRESERVATIVE FREE 10 ML: 5 INJECTION INTRAVENOUS at 08:40

## 2022-02-26 RX ADMIN — Medication 1 PACKET: at 08:39

## 2022-02-26 RX ADMIN — HYDRALAZINE HYDROCHLORIDE 20 MG: 20 INJECTION, SOLUTION INTRAMUSCULAR; INTRAVENOUS at 16:28

## 2022-02-26 RX ADMIN — LORAZEPAM 1 MG: 1 TABLET ORAL at 18:22

## 2022-02-26 RX ADMIN — SODIUM CHLORIDE, PRESERVATIVE FREE 10 ML: 5 INJECTION INTRAVENOUS at 14:24

## 2022-02-26 RX ADMIN — ENOXAPARIN SODIUM 40 MG: 100 INJECTION SUBCUTANEOUS at 08:39

## 2022-02-26 RX ADMIN — Medication 200 MG: at 08:39

## 2022-02-26 RX ADMIN — BISACODYL 5 MG: 5 TABLET, COATED ORAL at 14:22

## 2022-02-26 RX ADMIN — Medication 1 TABLET: at 08:39

## 2022-02-26 RX ADMIN — Medication 1 PACKET: at 16:23

## 2022-02-26 RX ADMIN — SODIUM CHLORIDE, PRESERVATIVE FREE 20 ML: 5 INJECTION INTRAVENOUS at 22:00

## 2022-02-26 RX ADMIN — FOLIC ACID 1 MG: 1 TABLET ORAL at 08:39

## 2022-02-26 NOTE — PROGRESS NOTES
[facility]  Hospitalist Group                                                                                          Hospitalist Progress Note  Aisha Hernandez MD  Answering service: [phone] from in house phone        Date of Service:  2022  NAME:  Jeevan He  :  1970  MRN:  [id number]      Admission Summary:   49year old male with a PMH of HTN, anxiety, and alcohol abuse. He presented to [facility] ED for weakness/dizziness. He stated that 1 week ago his appetite declined and throughout the week his inappetance continued while he hydrated with approximately 5 water bottles per day in addition to the 12 cans of beer he drinks at night. Patient states he suffers from insomnia and anxiety and he uses the beer to self medicate at night. Neither him nor his wife seemed to think this was a problem since he never appeared drunk, woke up hung over, or missed any days of work due to his drinking. There have been several falls at home recently. He currently takes Lisinopril and Hydrochlorothiazide for his HTN and reports he sees his physician every 6 months to have this evaluated. His chemistry revealed significant hyponatremia at 111. He received a 3% sodium chloride bolus with frequent monitoring of his sodium levels. He was hypertensive as well.  Critical care was consulted and patient will be transferred to the ICU for monitoring while his sodium levels are corrected.  Patient did note that he had a similar episode in January with lower sodium levels and his physician had recommended gatorade to help replenish his electrolytes.        Interval history / Subjective:   Feel OK today  Denies withdrawals symptoms including  Palpitations, anxiety, hallucinations, N/V, nervousness   Noted to have HR in 120s  Last ativan last night      Assessment & Plan:        Hyponatremia, symptomatic   Alcohol abuse  HTN  Sinus tachycardia  AMS could be from both Etoh withdrawal and hyponatremia Tx to step down  Cont BMP q8h  Avoid over correction of Na level (goal 6 mmol/L q24hrs)  Given DDAVP on 2/24  On fluid restriction  Cont Ure Na  Given 3% saline on admission, Hold off further hypertonic saline  Avoid HCTZ   CIWA protocol. Last day of Alcohol is 2/23  ECHO given sinus tachycardia   Monitor BP     Code status: full   Prophylaxis:   Care Plan discussed with: RN. Pt and wife   Anticipated Disposition: home      Hospital Problems  Never Reviewed          Codes Class Noted POA    Hyponatremia ICD-10-CM: E87.1  ICD-9-CM: 276.1  2/23/2022 Unknown                Review of Systems:   A comprehensive review of systems was negative except for that written in the HPI. Vital Signs:    Last 24hrs VS reviewed since prior progress note. Most recent are:  Visit Vitals  BP (!) 160/95   Pulse (!) 114   Temp 97.3 °F (36.3 °C)   Resp 18   Ht 5' 8\" (1.727 m)   Wt 86 kg (189 lb 9.5 oz)   SpO2 100%   BMI 28.83 kg/m²         Intake/Output Summary (Last 24 hours) at 2/26/2022 1833  Last data filed at 2/26/2022 1600  Gross per 24 hour   Intake 950 ml   Output 1075 ml   Net -125 ml        Physical Examination:     I had a face to face encounter with this patient and independently examined them on 2/26/2022 as outlined below:          Constitutional:  No acute distress, cooperative, pleasant    ENT:  Oral mucosa moist, oropharynx benign. Resp:  CTA bilaterally. No wheezing/rhonchi/rales. No accessory muscle use. CV:  Regular rhythm, normal rate, no murmurs, gallops, rubs    GI:  Soft, non distended, non tender. normoactive bowel sounds, no hepatosplenomegaly     Musculoskeletal:  No edema, warm, 2+ pulses throughout    Neurologic:  Moves all extremities.   AAOx3, CN II-XII reviewed            Data Review:    Review and/or order of clinical lab test  Review and/or order of tests in the radiology section of CPT  Review and/or order of tests in the medicine section of CPT  Review and summarization of old records and/or obtaining history from someone other     Labs:     Recent Labs     02/25/22  0300 02/24/22  0312   WBC 7.1 5.2   HGB 13.8 14.2   HCT 39.0 39.1    243     Recent Labs     02/26/22  1408 02/26/22  0551 02/25/22 2022 02/25/22  0829 02/25/22  0829 02/25/22  0555 02/25/22  0300   * 123* 123*   < > 122*   < > 122*   K 4.2 3.9 4.2   < > 4.2   < > 3.7   CL 96* 93* 91*   < > 91*   < > 90*   CO2 25 25 29   < > 26   < > 24   BUN 31* 22* 34*   < > 19   < > 22*   CREA 0.81 0.71 0.80   < > 0.72   < > 0.70   * 115* 116*   < > 99   < > 104*   CA 9.4 9.5 9.2   < > 9.4   < > 9.2   MG  --   --   --   --   --   --  2.4   PHOS  --  3.9 3.5  --  4.3   < > 3.9    < > = values in this interval not displayed. Recent Labs     02/26/22  0551 02/25/22 2022 02/25/22  0829   ALB 3.8 4.0 4.2     No results for input(s): INR, PTP, APTT, INREXT in the last 72 hours. No results for input(s): FE, TIBC, PSAT, FERR in the last 72 hours. No results found for: FOL, RBCF   No results for input(s): PH, PCO2, PO2 in the last 72 hours. No results for input(s): CPK, CKNDX, TROIQ in the last 72 hours.     No lab exists for component: CPKMB  No results found for: CHOL, CHOLX, CHLST, CHOLV, HDL, HDLP, LDL, LDLC, DLDLP, TGLX, TRIGL, TRIGP, CHHD, CHHDX  No results found for: [facility]  Lab Results   Component Value Date/Time    Color YELLOW/STRAW 02/23/2022 01:03 PM    Appearance CLEAR 02/23/2022 01:03 PM    Specific gravity 1.009 02/23/2022 01:03 PM    pH (UA) 8.0 02/23/2022 01:03 PM    Protein Negative 02/23/2022 01:03 PM    Glucose Negative 02/23/2022 01:03 PM    Ketone 15 (A) 02/23/2022 01:03 PM    Bilirubin Negative 02/23/2022 01:03 PM    Urobilinogen 1.0 02/23/2022 01:03 PM    Nitrites Negative 02/23/2022 01:03 PM    Leukocyte Esterase Negative 02/23/2022 01:03 PM    Epithelial cells FEW 02/23/2022 01:03 PM    Bacteria Negative 02/23/2022 01:03 PM    WBC 0-4 02/23/2022 01:03 PM    RBC 0-5 02/23/2022 01:03 PM         Medications Reviewed:     Current Facility-Administered Medications   Medication Dose Route Frequency    urea (URE-NA) 15 gram packet 1 Packet  1 Packet Oral BID    thiamine HCL (B-1) tablet 200 mg  200 mg Oral DAILY    sodium chloride (NS) flush 5-40 mL  5-40 mL IntraVENous Q8H    sodium chloride (NS) flush 5-40 mL  5-40 mL IntraVENous PRN    sodium chloride (NS) flush 5-40 mL  5-40 mL IntraVENous Q8H    sodium chloride (NS) flush 5-40 mL  5-40 mL IntraVENous PRN    acetaminophen (TYLENOL) tablet 650 mg  650 mg Oral Q6H PRN    Or    acetaminophen (TYLENOL) suppository 650 mg  650 mg Rectal Q6H PRN    enoxaparin (LOVENOX) injection 40 mg  40 mg SubCUTAneous DAILY    ondansetron (ZOFRAN) injection 4 mg  4 mg IntraVENous Q6H PRN    bisacodyL (DULCOLAX) tablet 5 mg  5 mg Oral DAILY PRN    LORazepam (ATIVAN) tablet 1 mg  1 mg Oral Q1H PRN    LORazepam (ATIVAN) tablet 2 mg  2 mg Oral R0B PRN    folic acid (FOLVITE) tablet 1 mg  1 mg Oral DAILY    multivitamin, tx-iron-ca-min (THERA-M w/ IRON) tablet 1 Tablet  1 Tablet Oral DAILY    hydrALAZINE (APRESOLINE) 20 mg/mL injection 10-20 mg  10-20 mg IntraVENous Q6H PRN    melatonin tablet 9 mg  9 mg Oral QHS PRN     ______________________________________________________________________  EXPECTED LENGTH OF STAY: - - -  ACTUAL LENGTH OF STAY:          3                 Saeid Byrnes MD

## 2022-02-26 NOTE — PROGRESS NOTES
Bedside shift change report received from [facility], [address] [place] [address]. Report included the following information SBAR, Kardex, Procedure Summary, Intake/Output, MAR and Recent Results. Shift Summary: Uneventful shift.

## 2022-02-26 NOTE — PROGRESS NOTES
Renal Progress Note    NAME:  Zack Cancer   :   1970   MRN:   [id number]     Date/Time:  2022       Assessment:   Hyponatremia (Severe) - likely sec to low/poor solute intake associated with alcohol abuse. (The serum osmo was 240, urine osmo 288, urine creat 44, urine Na 38). Hypertension  Alcohol Abuse - with withdrawal       Plan:   Mr Scarlett Shepherd received 3 % saline in the ER on . His serum sodium went from 111 (at 13:03 on )  --> 123  () this morning. His symptoms now seem consistent with alcohol withdrawal (tremors,visual hallucinations) vs symptomatic hyponatremia. Will hold further doses of 3 % saline for now. Continue Ure Na --> 15 grams po BID for now. He is correcting at the recommended rate of   4 to 6 meq/L/day. Tolvaptan may be considered. Will hold off for now. Continue fluid restriction for now  Increase the solute intake of the diet. Hold lisinopril. Avoid HCTZ in the future. Continue q 8 hourly serum sodium monitoring  Management of alcohol withdrawal will be deferred to the ICU team.  Abstinence from alcohol was stressed on Wed ()           Subjective:       F/U Hyponatremia --> 22    His course was discussed with ICU Nursing. He was restless overnight. Insomnia and visual/auditory hallucinations were reported. A dose of DDAVP was given this morning in an effort \" to slow the rate of correction \".    F/U Hyponatremia --> 22    Mr Scarlett Shepherd felt better. F/U Hyponatremia --> 22    No new complaints. Mr Scarlett Shepherd was feeling better.     Review of Systems:  Y  N       Y  N  []         []          Fever/chills                                               []         []          Chest Pain  []         []          Cough                                                       []         []          Diarrhea   []         []          Sputum                                                     []         []          Constipation  [] []          SOB/PERSAUD                                                []         []          Nausea/Vomit  []         []          Abd Pain                                                    []         []          Tolerating PT  []         []          Dysuria                                                      []         []          Tolerating Diet     []        Unable to obtain  ROS due to  []        mental status change  []        sedated   []        intubated    Medications reviewed:  Current Facility-Administered Medications   Medication Dose Route Frequency    thiamine HCL (B-1) tablet 200 mg  200 mg Oral DAILY    sodium chloride (NS) flush 5-40 mL  5-40 mL IntraVENous Q8H    sodium chloride (NS) flush 5-40 mL  5-40 mL IntraVENous PRN    sodium chloride (NS) flush 5-40 mL  5-40 mL IntraVENous Q8H    sodium chloride (NS) flush 5-40 mL  5-40 mL IntraVENous PRN    acetaminophen (TYLENOL) tablet 650 mg  650 mg Oral Q6H PRN    Or    acetaminophen (TYLENOL) suppository 650 mg  650 mg Rectal Q6H PRN    enoxaparin (LOVENOX) injection 40 mg  40 mg SubCUTAneous DAILY    ondansetron (ZOFRAN) injection 4 mg  4 mg IntraVENous Q6H PRN    bisacodyL (DULCOLAX) tablet 5 mg  5 mg Oral DAILY PRN    LORazepam (ATIVAN) tablet 1 mg  1 mg Oral Q1H PRN    LORazepam (ATIVAN) tablet 2 mg  2 mg Oral W8X PRN    folic acid (FOLVITE) tablet 1 mg  1 mg Oral DAILY    multivitamin, tx-iron-ca-min (THERA-M w/ IRON) tablet 1 Tablet  1 Tablet Oral DAILY    hydrALAZINE (APRESOLINE) 20 mg/mL injection 10-20 mg  10-20 mg IntraVENous Q6H PRN    melatonin tablet 9 mg  9 mg Oral QHS PRN        Objective:   Vitals:  Visit Vitals  BP (!) 145/100 (BP 1 Location: Right upper arm, BP Patient Position: At rest)   Pulse 95   Temp 97.7 °F (36.5 °C)   Resp 14   Ht 5' 8\" (1.727 m)   Wt 86 kg (189 lb 9.5 oz)   SpO2 100%   BMI 28.83 kg/m²     Temp (24hrs), Av.8 °F (36.6 °C), Min:97.6 °F (36.4 °C), Max:97.9 °F (36.6 °C)    O2 Flow Rate (L/min): 2 l/min O2 Device: None (Room air)    Last 24hr Input/Output:    Intake/Output Summary (Last 24 hours) at 2/26/2022 1016  Last data filed at 2/26/2022 0900  Gross per 24 hour   Intake 1000 ml   Output 1000 ml   Net 0 ml        PHYSICAL EXAM:    Seen in ICU - 18      General:    Alert, sitting in the chair quite comfortably       Head:   Normocephalic     Lungs:   Clear to auscultation , No Wheezes , No rales. Heart:   No S3 gallop , No pericardial rub  . Abdomen:   Not distended. Skin   :              Facial erythema     Psych:  Seemed composed. He was on his IPAD    Neurologic:      Responding to questions. Conversation was appropriate this morning.         []        Telemetry Reviewed     []        NSR []        PAC/PVCs   []        Afib  []        Paced   []        NSVT   []        [place] []        NGT  []        Intubated on vent    Lab Data Reviewed:    Recent Results (from the past 24 hour(s))   RENAL FUNCTION PANEL    Collection Time: 02/25/22  8:22 PM   Result Value Ref Range    Sodium 123 (L) 136 - 145 mmol/L    Potassium 4.2 3.5 - 5.1 mmol/L    Chloride 91 (L) 97 - 108 mmol/L    CO2 29 21 - 32 mmol/L    Anion gap 3 (L) 5 - 15 mmol/L    Glucose 116 (H) 65 - 100 mg/dL    BUN 34 (H) 6 - 20 MG/DL    Creatinine 0.80 0.70 - 1.30 MG/DL    BUN/Creatinine ratio 43 (H) 12 - 20      GFR est AA >60 >60 ml/min/1.73m2    GFR est non-AA >60 >60 ml/min/1.73m2    Calcium 9.2 8.5 - 10.1 MG/DL    Phosphorus 3.5 2.6 - 4.7 MG/DL    Albumin 4.0 3.5 - 5.0 g/dL   RENAL FUNCTION PANEL    Collection Time: 02/26/22  5:51 AM   Result Value Ref Range    Sodium 123 (L) 136 - 145 mmol/L    Potassium 3.9 3.5 - 5.1 mmol/L    Chloride 93 (L) 97 - 108 mmol/L    CO2 25 21 - 32 mmol/L    Anion gap 5 5 - 15 mmol/L    Glucose 115 (H) 65 - 100 mg/dL    BUN 22 (H) 6 - 20 MG/DL    Creatinine 0.71 0.70 - 1.30 MG/DL    BUN/Creatinine ratio 31 (H) 12 - 20      GFR est AA >60 >60 ml/min/1.73m2 GFR est non-AA >60 >60 ml/min/1.73m2    Calcium 9.5 8.5 - 10.1 MG/DL    Phosphorus 3.9 2.6 - 4.7 MG/DL    Albumin 3.8 3.5 - 5.0 g/dL       Total time spent with patient:  []        15   []        25   []        35   []         __ minutes    []        Critical Care Provided    Care Plan discussed with:          [x]        Patient   []        Family    []        Care Manager   []        Consultant/Specialist :      []          >50% of visit spent in counseling and coordination of care   (Discussed []        CODE status,  []        Care Plan, []        D/C Planning)    ___________________________________________________    Attending Physician: Richard Mo MD

## 2022-02-26 NOTE — PROGRESS NOTES
[facility]    ICU TEAM Progress Note    Name: Kelly Mohr   : 1970   MRN: [id number]   Date: 2022      I  Subjective:   Progress Note: 2022      Reason for ICU Admission: Hyponatremia, alcohol withdrawal     Interval history:  49year old male with a PMH of HTN, anxiety, and alcohol abuse. He presented to [facility] ED for weakness/dizziness. He stated that 1 week ago his appetite declined and throughout the week his inappetance continued while he hydrated with approximately 5 water bottles per day in addition to the 12 cans of beer he drinks at night. Patient states he suffers from insomnia and anxiety and he uses the beer to self medicate at night. Neither him nor his wife seemed to think this was a problem since he never appeared drunk, woke up hung over, or missed any days of work due to his drinking. There have been several falls at home recently. He currently takes Lisinopril and Hydrochlorothiazide for his HTN and reports he sees his physician every 6 months to have this evaluated. His chemistry revealed significant hyponatremia at 111. He received a 3% sodium chloride bolus with frequent monitoring of his sodium levels. He was hypertensive as well.  Critical care was consulted and patient will be transferred to the ICU for monitoring while his sodium levels are corrected. Patient did note that he had a similar episode in January with lower sodium levels and his physician had recommended gatorade to help replenish his electrolytes. Overnight Events:   No acute event, some tremors required Ativan once, off IV fluid, denies hallucination nausea or vomiting, tolerating diet. Active Problem List:     Problem List  Never Reviewed          Codes Class    Hyponatremia ICD-10-CM: E87.1  ICD-9-CM: 276.1               Past Medical History:      has no past medical history on file. Past Surgical History:      has no past surgical history on file.     Home Medications:     Prior to Admission medications    Not on File       Allergies/Social/Family History:     No Known Allergies   Social History     Tobacco Use    Smoking status: Not on file    Smokeless tobacco: Not on file   Substance Use Topics    Alcohol use: Not on file      No family history on file. Review of Systems:     I reviewed 10 system and they are negative but as in interval history    Objective:   Vital Signs:  Visit Vitals  BP (!) 145/100 (BP 1 Location: Right upper arm, BP Patient Position: At rest)   Pulse 95   Temp 97.7 °F (36.5 °C)   Resp 14   Ht 5' 8\" (1.727 m)   Wt 86 kg (189 lb 9.5 oz)   SpO2 100%   BMI 28.83 kg/m²    O2 Flow Rate (L/min): 2 l/min O2 Device: None (Room air) Temp (24hrs), Av.8 °F (36.6 °C), Min:97.6 °F (36.4 °C), Max:97.9 °F (36.6 °C)           Intake/Output:     Intake/Output Summary (Last 24 hours) at 2022 1050  Last data filed at 2022 0900  Gross per 24 hour   Intake 1000 ml   Output 1000 ml   Net 0 ml       Physical Exam:  General:  alert, cooperative, anxious, appears stated age  Eye:  Both Pupils round and reactive, EOM intact. Normal gaze. Neurologic:  No focal deficit. Slight tremor in hands. Lungs:  clear to auscultation bilaterally  Heart:  regular rate and rhythm, S1, S2 normal, no murmur, click, rub or gallop  Abdomen:  soft, non-tender.  Bowel sounds normal. No masses,  no organomegaly  Cardiovascular:  pedal pulses normal and no edema  Skin:  no rash or abnormalities    LABS AND  DATA: Personally reviewed  Recent Labs     22  0300 22  0312   WBC 7.1 5.2   HGB 13.8 14.2   HCT 39.0 39.1    243     Recent Labs     22  0551 22  0555 22  0300 22  1516 22  1415   * 123*   < > 122*   < >  --    K 3.9 4.2   < > 3.7   < >  --    CL 93* 91*   < > 90*   < >  --    CO2 25 29   < > 24   < >  --    BUN 22* 34*   < > 22*   < >  --    CREA 0.71 0.80   < > 0.70   < >  --    * 116*   < > 104*   < >  --    CA 9.5 9.2   < > 9.2   < >  --    MG  --   --   --  2.4  --  2.0   PHOS 3.9 3.5   < > 3.9   < >  --     < > = values in this interval not displayed. Recent Labs     02/26/22  0551 02/25/22 2022 02/23/22 2038 02/23/22  1303   AP  --   --   --  71   TP  --   --   --  8.3*   ALB 3.8 4.0   < > 4.6   GLOB  --   --   --  3.7    < > = values in this interval not displayed. No results for input(s): INR, PTP, APTT, INREXT in the last 72 hours. No results for input(s): PHI, PCO2I, PO2I, FIO2I in the last 72 hours. No results for input(s): CPK, CKMB, TROIQ, BNPP in the last 72 hours. Hemodynamics:   PAP:   CO:     Wedge:   CI:     CVP:    SVR:       PVR:       Ventilator Settings:  Mode Rate Tidal Volume Pressure FiO2 PEEP                    Peak airway pressure:      Minute ventilation:          MEDS: Reviewed      Assessment and Plan:   -Hyponatremia:  -Alcohol abuse with early alcohol withdrawal:  -Hypertension:     -Appreciate nephrology, currently off IV fluid, tolerating oral diet. Continue to monitor sodium. I change the lab frequency to once daily  -Mild withdrawal symptoms. Continue with CIWA protocol. Minimal requirement for Ativan. Last drink was just before admission to the hospital 3 days ago. No previous history of DTs   -Avoid hydrochlorothiazide in the future    DISPOSITION  At this time no further need for ICU level of care. Will transfer to medical floor    CRITICAL CARE CONSULTANT NOTE  I had a face to face encounter with the patient, reviewed and interpreted patient data including clinical events, labs, images, vital signs, I/O's, and examined patient.   I have discussed the case and the plan and management of the patient's care with the consulting services, the bedside nurses and the respiratory therapist.      NOTE OF PERSONAL INVOLVEMENT IN CARE   This patient has a high probability of imminent, clinically significant deterioration, which requires the highest level of preparedness to intervene urgently. I participated in the decision-making and personally managed or directed the management of the following life and organ supporting interventions that required my frequent assessment to treat or prevent imminent deterioration. Buddy Chin M.D.   Staff Intensivist/Pulmonologist  [facility]  2/26/2022

## 2022-02-26 NOTE — PROGRESS NOTES
0730: Bedside and Verbal shift change report given to Mariana EVANS RN (oncoming nurse) by Ivanna Rodriguez RN (offgoing nurse). Report included the following information SBAR, Kardex, Intake/Output, MAR, Recent Results and Cardiac Rhythm NSR.     1930: Bedside and Verbal shift change report given to [address] [place] [address] (oncoming nurse) by Tree Dobson RN (offgoing nurse).  Report included the following information SBAR, Kardex, Intake/Output, MAR, Recent Results and Cardiac Rhythm ST.

## 2022-02-27 LAB
ANION GAP SERPL CALC-SCNC: 2 MMOL/L (ref 5–15)
ANION GAP SERPL CALC-SCNC: 4 MMOL/L (ref 5–15)
ANION GAP SERPL CALC-SCNC: 7 MMOL/L (ref 5–15)
ATRIAL RATE: 110 BPM
BUN SERPL-MCNC: 23 MG/DL (ref 6–20)
BUN SERPL-MCNC: 29 MG/DL (ref 6–20)
BUN SERPL-MCNC: 30 MG/DL (ref 6–20)
BUN/CREAT SERPL: 29 (ref 12–20)
BUN/CREAT SERPL: 35 (ref 12–20)
BUN/CREAT SERPL: 40 (ref 12–20)
CALCIUM SERPL-MCNC: 9.3 MG/DL (ref 8.5–10.1)
CALCIUM SERPL-MCNC: 9.4 MG/DL (ref 8.5–10.1)
CALCIUM SERPL-MCNC: 9.5 MG/DL (ref 8.5–10.1)
CALCULATED P AXIS, ECG09: 67 DEGREES
CALCULATED R AXIS, ECG10: -26 DEGREES
CALCULATED T AXIS, ECG11: 56 DEGREES
CHLORIDE SERPL-SCNC: 95 MMOL/L (ref 97–108)
CHLORIDE SERPL-SCNC: 96 MMOL/L (ref 97–108)
CHLORIDE SERPL-SCNC: 98 MMOL/L (ref 97–108)
CO2 SERPL-SCNC: 23 MMOL/L (ref 21–32)
CO2 SERPL-SCNC: 28 MMOL/L (ref 21–32)
CO2 SERPL-SCNC: 29 MMOL/L (ref 21–32)
CREAT SERPL-MCNC: 0.72 MG/DL (ref 0.7–1.3)
CREAT SERPL-MCNC: 0.78 MG/DL (ref 0.7–1.3)
CREAT SERPL-MCNC: 0.85 MG/DL (ref 0.7–1.3)
DIAGNOSIS, 93000: NORMAL
GLUCOSE SERPL-MCNC: 108 MG/DL (ref 65–100)
GLUCOSE SERPL-MCNC: 113 MG/DL (ref 65–100)
GLUCOSE SERPL-MCNC: 128 MG/DL (ref 65–100)
P-R INTERVAL, ECG05: 170 MS
POTASSIUM SERPL-SCNC: 3.7 MMOL/L (ref 3.5–5.1)
POTASSIUM SERPL-SCNC: 4.1 MMOL/L (ref 3.5–5.1)
POTASSIUM SERPL-SCNC: 4.6 MMOL/L (ref 3.5–5.1)
Q-T INTERVAL, ECG07: 316 MS
QRS DURATION, ECG06: 98 MS
QTC CALCULATION (BEZET), ECG08: 427 MS
SODIUM SERPL-SCNC: 126 MMOL/L (ref 136–145)
SODIUM SERPL-SCNC: 128 MMOL/L (ref 136–145)
SODIUM SERPL-SCNC: 128 MMOL/L (ref 136–145)
VENTRICULAR RATE, ECG03: 110 BPM

## 2022-02-27 PROCEDURE — 74011000250 HC RX REV CODE- 250: Performed by: NURSE PRACTITIONER

## 2022-02-27 PROCEDURE — 74011250637 HC RX REV CODE- 250/637: Performed by: INTERNAL MEDICINE

## 2022-02-27 PROCEDURE — 36415 COLL VENOUS BLD VENIPUNCTURE: CPT

## 2022-02-27 PROCEDURE — 74011250637 HC RX REV CODE- 250/637: Performed by: NURSE PRACTITIONER

## 2022-02-27 PROCEDURE — 74011250637 HC RX REV CODE- 250/637: Performed by: ANESTHESIOLOGY

## 2022-02-27 PROCEDURE — 65660000001 HC RM ICU INTERMED STEPDOWN

## 2022-02-27 PROCEDURE — 80048 BASIC METABOLIC PNL TOTAL CA: CPT

## 2022-02-27 PROCEDURE — 74011000250 HC RX REV CODE- 250: Performed by: EMERGENCY MEDICINE

## 2022-02-27 PROCEDURE — 74011250636 HC RX REV CODE- 250/636: Performed by: NURSE PRACTITIONER

## 2022-02-27 RX ORDER — HYDROCHLOROTHIAZIDE 12.5 MG/1
12.5 TABLET ORAL DAILY
COMMUNITY
End: 2022-03-01

## 2022-02-27 RX ORDER — LISINOPRIL 40 MG/1
40 TABLET ORAL DAILY
COMMUNITY

## 2022-02-27 RX ADMIN — LORAZEPAM 2 MG: 1 TABLET ORAL at 00:01

## 2022-02-27 RX ADMIN — SODIUM CHLORIDE, PRESERVATIVE FREE 10 ML: 5 INJECTION INTRAVENOUS at 14:01

## 2022-02-27 RX ADMIN — FOLIC ACID 1 MG: 1 TABLET ORAL at 08:12

## 2022-02-27 RX ADMIN — Medication 200 MG: at 08:12

## 2022-02-27 RX ADMIN — Medication 1 PACKET: at 15:53

## 2022-02-27 RX ADMIN — LORAZEPAM 1 MG: 1 TABLET ORAL at 17:49

## 2022-02-27 RX ADMIN — ENOXAPARIN SODIUM 40 MG: 100 INJECTION SUBCUTANEOUS at 09:59

## 2022-02-27 RX ADMIN — HYDRALAZINE HYDROCHLORIDE 20 MG: 20 INJECTION, SOLUTION INTRAMUSCULAR; INTRAVENOUS at 15:53

## 2022-02-27 RX ADMIN — Medication 1 PACKET: at 08:12

## 2022-02-27 RX ADMIN — SODIUM CHLORIDE, PRESERVATIVE FREE 20 ML: 5 INJECTION INTRAVENOUS at 06:00

## 2022-02-27 RX ADMIN — SODIUM CHLORIDE, PRESERVATIVE FREE 10 ML: 5 INJECTION INTRAVENOUS at 22:00

## 2022-02-27 RX ADMIN — Medication 1 TABLET: at 08:12

## 2022-02-27 RX ADMIN — Medication 9 MG: at 00:01

## 2022-02-27 RX ADMIN — LORAZEPAM 2 MG: 1 TABLET ORAL at 23:27

## 2022-02-27 RX ADMIN — Medication 9 MG: at 23:27

## 2022-02-27 NOTE — PROGRESS NOTES
Bedside shift change report received from [facility] [place] [facility] JAMES. Report included the following information SBAR, Kardex, Procedure Summary, Intake/Output, MAR and Recent Results.

## 2022-02-27 NOTE — PROGRESS NOTES
[facility]  Hospitalist Group                                                                                          Hospitalist Progress Note  Nilson Montaño MD  Answering service: [phone] OR [phone] from in house phone        Date of Service:  2022  NAME:  Madhav Rodríguez  :  1970  MRN:  [id number]      Admission Summary:   49year old male with a PMH of HTN, anxiety, and alcohol abuse. He presented to [facility] ED for weakness/dizziness. He stated that 1 week ago his appetite declined and throughout the week his inappetance continued while he hydrated with approximately 5 water bottles per day in addition to the 12 cans of beer he drinks at night. Patient states he suffers from insomnia and anxiety and he uses the beer to self medicate at night. Neither him nor his wife seemed to think this was a problem since he never appeared drunk, woke up hung over, or missed any days of work due to his drinking. There have been several falls at home recently. He currently takes Lisinopril and Hydrochlorothiazide for his HTN and reports he sees his physician every 6 months to have this evaluated. His chemistry revealed significant hyponatremia at 111. He received a 3% sodium chloride bolus with frequent monitoring of his sodium levels. He was hypertensive as well.  Critical care was consulted and patient will be transferred to the ICU for monitoring while his sodium levels are corrected.  Patient did note that he had a similar episode in January with lower sodium levels and his physician had recommended gatorade to help replenish his electrolytes.        Interval history / Subjective:   Feel OK today  Denies withdrawals symptoms including  Palpitations, anxiety, hallucinations, N/V, nervousness   BP and HR better today   Required 3 mg of Ativan over last 24 hrs      Assessment & Plan:        Hyponatremia, symptomatic   Alcohol abuse  HTN  Sinus tachycardia  AMS could be from both Etoh withdrawal and hyponatremia   Tx to step down  Cont BMP q8h  Avoid over correction of Na level (goal 6 mmol/L q24hrs)  Given DDAVP on 2/24  Given 3% saline on admission, Hold off further hypertonic saline  On fluid restriction  Cont Ure Na  Avoid HCTZ   CIWA protocol. Last day of Alcohol is 2/23. Cont B1 and Folic acid  ECHO given sinus tachycardia   Monitor BP     Code status: full   Prophylaxis:   Care Plan discussed with: RN. Pt and wife   Anticipated Disposition: home      Hospital Problems  Never Reviewed          Codes Class Noted POA    Hyponatremia ICD-10-CM: E87.1  ICD-9-CM: 276.1  2/23/2022 Unknown                Review of Systems:   A comprehensive review of systems was negative except for that written in the HPI. Vital Signs:    Last 24hrs VS reviewed since prior progress note. Most recent are:  Visit Vitals  BP (!) 116/95   Pulse 69   Temp 98 °F (36.7 °C)   Resp 15   Ht 5' 8\" (1.727 m)   Wt 85 kg (187 lb 6.3 oz)   SpO2 98%   BMI 28.49 kg/m²         Intake/Output Summary (Last 24 hours) at 2/27/2022 0753  Last data filed at 2/27/2022 0600  Gross per 24 hour   Intake 1050 ml   Output 900 ml   Net 150 ml        Physical Examination:     I had a face to face encounter with this patient and independently examined them on 2/27/2022 as outlined below:          Constitutional:  No acute distress, cooperative, pleasant    ENT:  Oral mucosa moist, oropharynx benign. Resp:  CTA bilaterally. No wheezing/rhonchi/rales. No accessory muscle use. CV:  Regular rhythm, normal rate, no murmurs, gallops, rubs    GI:  Soft, non distended, non tender. normoactive bowel sounds, no hepatosplenomegaly     Musculoskeletal:  No edema, warm, 2+ pulses throughout    Neurologic:  Moves all extremities.   AAOx3, CN II-XII reviewed            Data Review:    Review and/or order of clinical lab test  Review and/or order of tests in the radiology section of CPT  Review and/or order of tests in the medicine section of CPT  Review and summarization of old records and/or obtaining history from someone other     Labs:     Recent Labs     02/25/22  0300   WBC 7.1   HGB 13.8   HCT 39.0        Recent Labs     02/27/22  0644 02/26/22  2145 02/26/22  1408 02/26/22  0551 02/26/22  0551 02/25/22 2022 02/25/22 2022 02/25/22  0829 02/25/22  0829 02/25/22  0555 02/25/22  0300   * 124* 125*   < > 123*   < > 123*   < > 122*   < > 122*   K 4.1 3.9 4.2   < > 3.9   < > 4.2   < > 4.2   < > 3.7   CL 96* 93* 96*   < > 93*   < > 91*   < > 91*   < > 90*   CO2 28 27 25   < > 25   < > 29   < > 26   < > 24   BUN 23* 31* 31*   < > 22*   < > 34*   < > 19   < > 22*   CREA 0.78 0.92 0.81   < > 0.71   < > 0.80   < > 0.72   < > 0.70   * 128* 121*   < > 115*   < > 116*   < > 99   < > 104*   CA 9.5 9.4 9.4   < > 9.5   < > 9.2   < > 9.4   < > 9.2   MG  --   --   --   --   --   --   --   --   --   --  2.4   PHOS  --   --   --   --  3.9  --  3.5  --  4.3   < > 3.9    < > = values in this interval not displayed. Recent Labs     02/26/22  0551 02/25/22 2022 02/25/22  0829   ALB 3.8 4.0 4.2     No results for input(s): INR, PTP, APTT, INREXT, INREXT in the last 72 hours. No results for input(s): FE, TIBC, PSAT, FERR in the last 72 hours. No results found for: FOL, RBCF   No results for input(s): PH, PCO2, PO2 in the last 72 hours. No results for input(s): CPK, CKNDX, TROIQ in the last 72 hours.     No lab exists for component: CPKMB  No results found for: CHOL, CHOLX, CHLST, CHOLV, HDL, HDLP, LDL, LDLC, DLDLP, TGLX, TRIGL, TRIGP, CHHD, CHHDX  No results found for: [facility]  Lab Results   Component Value Date/Time    Color YELLOW/STRAW 02/23/2022 01:03 PM    Appearance CLEAR 02/23/2022 01:03 PM    Specific gravity 1.009 02/23/2022 01:03 PM    pH (UA) 8.0 02/23/2022 01:03 PM    Protein Negative 02/23/2022 01:03 PM    Glucose Negative 02/23/2022 01:03 PM    Ketone 15 (A) 02/23/2022 01:03 PM    Bilirubin Negative 02/23/2022 01:03 PM    Urobilinogen 1.0 02/23/2022 01:03 PM    Nitrites Negative 02/23/2022 01:03 PM    Leukocyte Esterase Negative 02/23/2022 01:03 PM    Epithelial cells FEW 02/23/2022 01:03 PM    Bacteria Negative 02/23/2022 01:03 PM    WBC 0-4 02/23/2022 01:03 PM    RBC 0-5 02/23/2022 01:03 PM         Medications Reviewed:     Current Facility-Administered Medications   Medication Dose Route Frequency    urea (URE-NA) 15 gram packet 1 Packet  1 Packet Oral BID    thiamine HCL (B-1) tablet 200 mg  200 mg Oral DAILY    sodium chloride (NS) flush 5-40 mL  5-40 mL IntraVENous Q8H    sodium chloride (NS) flush 5-40 mL  5-40 mL IntraVENous PRN    sodium chloride (NS) flush 5-40 mL  5-40 mL IntraVENous Q8H    sodium chloride (NS) flush 5-40 mL  5-40 mL IntraVENous PRN    acetaminophen (TYLENOL) tablet 650 mg  650 mg Oral Q6H PRN    Or    acetaminophen (TYLENOL) suppository 650 mg  650 mg Rectal Q6H PRN    enoxaparin (LOVENOX) injection 40 mg  40 mg SubCUTAneous DAILY    ondansetron (ZOFRAN) injection 4 mg  4 mg IntraVENous Q6H PRN    bisacodyL (DULCOLAX) tablet 5 mg  5 mg Oral DAILY PRN    LORazepam (ATIVAN) tablet 1 mg  1 mg Oral Q1H PRN    LORazepam (ATIVAN) tablet 2 mg  2 mg Oral Y2T PRN    folic acid (FOLVITE) tablet 1 mg  1 mg Oral DAILY    multivitamin, tx-iron-ca-min (THERA-M w/ IRON) tablet 1 Tablet  1 Tablet Oral DAILY    hydrALAZINE (APRESOLINE) 20 mg/mL injection 10-20 mg  10-20 mg IntraVENous Q6H PRN    melatonin tablet 9 mg  9 mg Oral QHS PRN     ______________________________________________________________________  EXPECTED LENGTH OF STAY: - - -  ACTUAL LENGTH OF STAY:          4                 Gabriele Worthy MD

## 2022-02-27 NOTE — PROGRESS NOTES
0730: Bedside and Verbal shift change report given to Mariana EVANS RN (oncoming nurse) by Aleja Sinha RN (offgoing nurse). Report included the following information SBAR, Kardex, Intake/Output, MAR, Recent Results and Cardiac Rhythm NSR/ST. 1930: Bedside and Verbal shift change report given to [address] [place] [address] (oncoming nurse) by Octavio Rdz RN (offgoing nurse). Report included the following information SBAR, Kardex, Intake/Output, MAR, Recent Results and Cardiac Rhythm NSR/ST.

## 2022-02-27 NOTE — PROGRESS NOTES
Renal Progress Note    NAME:  Caitlin Lobo   :   1970   MRN:   [id number]     Date/Time:  2022       Assessment:   Hyponatremia (Severe) - likely sec to low/poor solute intake associated with alcohol abuse. (The serum osmo was 240, urine osmo 288, urine creat 44, urine Na 38). Hypertension  Alcohol Abuse - with withdrawal       Plan:   Mr Mahendra Thomas received 3 % saline in the ER on . His serum sodium went from 111 (at 13:03 on )  --> 126  () this morning. His symptoms now seem consistent with alcohol withdrawal (tremors,visual hallucinations) vs symptomatic hyponatremia. Will hold further doses of 3 % saline   Continue Ure Na --> 15 grams po BID for now. He is correcting at the recommended rate of   4 to 6 meq/L/day. Tolvaptan may be considered. Will hold off for now. Continue fluid restriction for now  Increase the solute intake of the diet. Hold lisinopril. Avoid HCTZ in the future. Continue sodium monitoring  Management of alcohol withdrawal will be deferred to the ICU team.  Abstinence from alcohol was stressed on Wed ()           Subjective:       F/U Hyponatremia --> 22    His course was discussed with ICU Nursing. He was restless overnight. Insomnia and visual/auditory hallucinations were reported. A dose of DDAVP was given this morning in an effort \" to slow the rate of correction \".    F/U Hyponatremia --> 22    Mr Mahendra Thomas felt better. F/U Hyponatremia --> 22    No new complaints. Mr Mahendra Thomas was feeling better. F/U - Hyponatremia --> 22    Feeling better. Mr Mahendra Thomas is anxious to go home.     Review of Systems:  Y  N       Y  N  []         []          Fever/chills                                               []         []          Chest Pain  []         []          Cough                                                       []         []          Diarrhea   []         []          Sputum []         []          Constipation  []         []          SOB/PERSAUD                                                []         []          Nausea/Vomit  []         []          Abd Pain                                                    []         []          Tolerating PT  []         []          Dysuria                                                      []         []          Tolerating Diet     []        Unable to obtain  ROS due to  []        mental status change  []        sedated   []        intubated    Medications reviewed:  Current Facility-Administered Medications   Medication Dose Route Frequency    urea (URE-NA) 15 gram packet 1 Packet  1 Packet Oral BID    sodium chloride (NS) flush 5-40 mL  5-40 mL IntraVENous Q8H    sodium chloride (NS) flush 5-40 mL  5-40 mL IntraVENous PRN    sodium chloride (NS) flush 5-40 mL  5-40 mL IntraVENous Q8H    sodium chloride (NS) flush 5-40 mL  5-40 mL IntraVENous PRN    acetaminophen (TYLENOL) tablet 650 mg  650 mg Oral Q6H PRN    Or    acetaminophen (TYLENOL) suppository 650 mg  650 mg Rectal Q6H PRN    enoxaparin (LOVENOX) injection 40 mg  40 mg SubCUTAneous DAILY    ondansetron (ZOFRAN) injection 4 mg  4 mg IntraVENous Q6H PRN    bisacodyL (DULCOLAX) tablet 5 mg  5 mg Oral DAILY PRN    LORazepam (ATIVAN) tablet 1 mg  1 mg Oral Q1H PRN    LORazepam (ATIVAN) tablet 2 mg  2 mg Oral I9G PRN    folic acid (FOLVITE) tablet 1 mg  1 mg Oral DAILY    multivitamin, tx-iron-ca-min (THERA-M w/ IRON) tablet 1 Tablet  1 Tablet Oral DAILY    hydrALAZINE (APRESOLINE) 20 mg/mL injection 10-20 mg  10-20 mg IntraVENous Q6H PRN    melatonin tablet 9 mg  9 mg Oral QHS PRN        Objective:   Vitals:  Visit Vitals  BP (!) 154/96   Pulse 79   Temp 98.1 °F (36.7 °C)   Resp 14   Ht 5' 8\" (1.727 m)   Wt 85 kg (187 lb 6.3 oz)   SpO2 99%   BMI 28.49 kg/m²     Temp (24hrs), Av.9 °F (36.6 °C), Min:97.3 °F (36.3 °C), Max:98.1 °F (36.7 °C)    O2 Flow Rate (L/min): 2 l/min O2 Device: None (Room air)    Last 24hr Input/Output:    Intake/Output Summary (Last 24 hours) at 2/27/2022 1203  Last data filed at 2/27/2022 1000  Gross per 24 hour   Intake 840 ml   Output 1175 ml   Net -335 ml        PHYSICAL EXAM:    Seen in ICU - 18      General:    Lying in bed comfortably       Head:   Normocephalic     Lungs:   Clear to auscultation , No Wheezes , No rales. Heart:   No S3 gallop , No pericardial rub  . Abdomen:   Not distended. Skin   :              Facial erythema     Psych:  Seemed more composed.                            Neurologic:      Responding to questions appropriately                                  []        Telemetry Reviewed     []        NSR []        PAC/PVCs   []        Afib  []        Paced   []        NSVT   []        [place] []        NGT  []        Intubated on vent    Lab Data Reviewed:    Recent Results (from the past 24 hour(s))   METABOLIC PANEL, BASIC    Collection Time: 02/26/22  2:08 PM   Result Value Ref Range    Sodium 125 (L) 136 - 145 mmol/L    Potassium 4.2 3.5 - 5.1 mmol/L    Chloride 96 (L) 97 - 108 mmol/L    CO2 25 21 - 32 mmol/L    Anion gap 4 (L) 5 - 15 mmol/L    Glucose 121 (H) 65 - 100 mg/dL    BUN 31 (H) 6 - 20 MG/DL    Creatinine 0.81 0.70 - 1.30 MG/DL    BUN/Creatinine ratio 38 (H) 12 - 20      GFR est AA >60 >60 ml/min/1.73m2    GFR est non-AA >60 >60 ml/min/1.73m2    Calcium 9.4 8.5 - 10.1 MG/DL   EKG, 12 LEAD, INITIAL    Collection Time: 02/26/22  6:09 PM   Result Value Ref Range    Ventricular Rate 110 BPM    Atrial Rate 110 BPM    P-R Interval 170 ms    QRS Duration 98 ms    Q-T Interval 316 ms    QTC Calculation (Bezet) 427 ms    Calculated P Axis 67 degrees    Calculated R Axis -26 degrees    Calculated T Axis 56 degrees    Diagnosis       Sinus tachycardia with fusion complexes  When compared with ECG of 23-FEB-2022 13:21,  fusion complexes are now present  Left anterior fascicular block is no longer present METABOLIC PANEL, BASIC    Collection Time: 02/26/22  9:45 PM   Result Value Ref Range    Sodium 124 (L) 136 - 145 mmol/L    Potassium 3.9 3.5 - 5.1 mmol/L    Chloride 93 (L) 97 - 108 mmol/L    CO2 27 21 - 32 mmol/L    Anion gap 4 (L) 5 - 15 mmol/L    Glucose 128 (H) 65 - 100 mg/dL    BUN 31 (H) 6 - 20 MG/DL    Creatinine 0.92 0.70 - 1.30 MG/DL    BUN/Creatinine ratio 34 (H) 12 - 20      GFR est AA >60 >60 ml/min/1.73m2    GFR est non-AA >60 >60 ml/min/1.73m2    Calcium 9.4 8.5 - 61.5 MG/DL   METABOLIC PANEL, BASIC    Collection Time: 02/27/22  6:44 AM   Result Value Ref Range    Sodium 126 (L) 136 - 145 mmol/L    Potassium 4.1 3.5 - 5.1 mmol/L    Chloride 96 (L) 97 - 108 mmol/L    CO2 28 21 - 32 mmol/L    Anion gap 2 (L) 5 - 15 mmol/L    Glucose 108 (H) 65 - 100 mg/dL    BUN 23 (H) 6 - 20 MG/DL    Creatinine 0.78 0.70 - 1.30 MG/DL    BUN/Creatinine ratio 29 (H) 12 - 20      GFR est AA >60 >60 ml/min/1.73m2    GFR est non-AA >60 >60 ml/min/1.73m2    Calcium 9.5 8.5 - 10.1 MG/DL       Total time spent with patient:  []        15   []        25   []        35   []         __ minutes    []        Critical Care Provided    Care Plan discussed with:          [x]        Patient   []        Family    []        Care Manager   []        Consultant/Specialist :      []          >50% of visit spent in counseling and coordination of care   (Discussed []        CODE status,  []        Care Plan, []        D/C Planning)    ___________________________________________________    Attending Physician: Martínez Noguera MD

## 2022-02-28 ENCOUNTER — APPOINTMENT (OUTPATIENT)
Dept: NON INVASIVE DIAGNOSTICS | Age: 52
DRG: 640 | End: 2022-02-28
Attending: GENERAL ACUTE CARE HOSPITAL
Payer: COMMERCIAL

## 2022-02-28 LAB
ALBUMIN SERPL-MCNC: 3.5 G/DL (ref 3.5–5)
ANION GAP SERPL CALC-SCNC: 4 MMOL/L (ref 5–15)
ANION GAP SERPL CALC-SCNC: 4 MMOL/L (ref 5–15)
BUN SERPL-MCNC: 24 MG/DL (ref 6–20)
BUN SERPL-MCNC: 36 MG/DL (ref 6–20)
BUN/CREAT SERPL: 37 (ref 12–20)
BUN/CREAT SERPL: 46 (ref 12–20)
CALCIUM SERPL-MCNC: 9.2 MG/DL (ref 8.5–10.1)
CALCIUM SERPL-MCNC: 9.5 MG/DL (ref 8.5–10.1)
CHLORIDE SERPL-SCNC: 98 MMOL/L (ref 97–108)
CHLORIDE SERPL-SCNC: 99 MMOL/L (ref 97–108)
CO2 SERPL-SCNC: 26 MMOL/L (ref 21–32)
CO2 SERPL-SCNC: 27 MMOL/L (ref 21–32)
CREAT SERPL-MCNC: 0.65 MG/DL (ref 0.7–1.3)
CREAT SERPL-MCNC: 0.78 MG/DL (ref 0.7–1.3)
ECHO AO ROOT DIAM: 3.6 CM
ECHO AO ROOT INDEX: 1.81 CM/M2
ECHO AV PEAK GRADIENT: 5 MMHG
ECHO AV PEAK VELOCITY: 1.1 M/S
ECHO AV VELOCITY RATIO: 0.91
ECHO LA DIAMETER INDEX: 1.61 CM/M2
ECHO LA DIAMETER: 3.2 CM
ECHO LA TO AORTIC ROOT RATIO: 0.89
ECHO LV E' LATERAL VELOCITY: 13 CM/S
ECHO LV E' SEPTAL VELOCITY: 8 CM/S
ECHO LV FRACTIONAL SHORTENING: 38 % (ref 28–44)
ECHO LV INTERNAL DIMENSION DIASTOLE INDEX: 2.36 CM/M2
ECHO LV INTERNAL DIMENSION DIASTOLIC: 4.7 CM (ref 4.2–5.9)
ECHO LV INTERNAL DIMENSION SYSTOLIC INDEX: 1.46 CM/M2
ECHO LV INTERNAL DIMENSION SYSTOLIC: 2.9 CM
ECHO LV IVSD: 0.9 CM (ref 0.6–1)
ECHO LV MASS 2D: 153.4 G (ref 88–224)
ECHO LV MASS INDEX 2D: 77.1 G/M2 (ref 49–115)
ECHO LV POSTERIOR WALL DIASTOLIC: 1 CM (ref 0.6–1)
ECHO LV RELATIVE WALL THICKNESS RATIO: 0.43
ECHO LVOT PEAK GRADIENT: 4 MMHG
ECHO LVOT PEAK VELOCITY: 1 M/S
ECHO MV A VELOCITY: 0.58 M/S
ECHO MV AREA PHT: 5.5 CM2
ECHO MV E DECELERATION TIME (DT): 138.2 MS
ECHO MV E VELOCITY: 0.82 M/S
ECHO MV E/A RATIO: 1.41
ECHO MV E/E' LATERAL: 6.31
ECHO MV E/E' RATIO (AVERAGED): 8.28
ECHO MV E/E' SEPTAL: 10.25
ECHO MV PRESSURE HALF TIME (PHT): 40.1 MS
ECHO RV TAPSE: 2.4 CM (ref 1.5–2)
GLUCOSE SERPL-MCNC: 103 MG/DL (ref 65–100)
GLUCOSE SERPL-MCNC: 99 MG/DL (ref 65–100)
PHOSPHATE SERPL-MCNC: 4.3 MG/DL (ref 2.6–4.7)
POTASSIUM SERPL-SCNC: 3.8 MMOL/L (ref 3.5–5.1)
POTASSIUM SERPL-SCNC: 4.4 MMOL/L (ref 3.5–5.1)
SODIUM SERPL-SCNC: 129 MMOL/L (ref 136–145)
SODIUM SERPL-SCNC: 129 MMOL/L (ref 136–145)

## 2022-02-28 PROCEDURE — 36415 COLL VENOUS BLD VENIPUNCTURE: CPT

## 2022-02-28 PROCEDURE — 65270000029 HC RM PRIVATE

## 2022-02-28 PROCEDURE — 74011250637 HC RX REV CODE- 250/637: Performed by: NURSE PRACTITIONER

## 2022-02-28 PROCEDURE — 80048 BASIC METABOLIC PNL TOTAL CA: CPT

## 2022-02-28 PROCEDURE — 74011000250 HC RX REV CODE- 250: Performed by: EMERGENCY MEDICINE

## 2022-02-28 PROCEDURE — 74011000250 HC RX REV CODE- 250: Performed by: NURSE PRACTITIONER

## 2022-02-28 PROCEDURE — 74011250637 HC RX REV CODE- 250/637: Performed by: INTERNAL MEDICINE

## 2022-02-28 PROCEDURE — 93306 TTE W/DOPPLER COMPLETE: CPT

## 2022-02-28 PROCEDURE — 74011250636 HC RX REV CODE- 250/636: Performed by: NURSE PRACTITIONER

## 2022-02-28 PROCEDURE — 80069 RENAL FUNCTION PANEL: CPT

## 2022-02-28 RX ORDER — LANOLIN ALCOHOL/MO/W.PET/CERES
100 CREAM (GRAM) TOPICAL DAILY
Status: DISCONTINUED | OUTPATIENT
Start: 2022-03-01 | End: 2022-03-01 | Stop reason: HOSPADM

## 2022-02-28 RX ADMIN — HYDRALAZINE HYDROCHLORIDE 20 MG: 20 INJECTION, SOLUTION INTRAMUSCULAR; INTRAVENOUS at 15:47

## 2022-02-28 RX ADMIN — SODIUM CHLORIDE, PRESERVATIVE FREE 5 ML: 5 INJECTION INTRAVENOUS at 22:00

## 2022-02-28 RX ADMIN — FOLIC ACID 1 MG: 1 TABLET ORAL at 08:30

## 2022-02-28 RX ADMIN — Medication 1 PACKET: at 18:23

## 2022-02-28 RX ADMIN — Medication 1 PACKET: at 10:32

## 2022-02-28 RX ADMIN — Medication 9 MG: at 23:21

## 2022-02-28 RX ADMIN — Medication 1 TABLET: at 08:30

## 2022-02-28 RX ADMIN — ENOXAPARIN SODIUM 40 MG: 100 INJECTION SUBCUTANEOUS at 08:30

## 2022-02-28 RX ADMIN — SODIUM CHLORIDE, PRESERVATIVE FREE 10 ML: 5 INJECTION INTRAVENOUS at 15:47

## 2022-02-28 RX ADMIN — SODIUM CHLORIDE, PRESERVATIVE FREE 10 ML: 5 INJECTION INTRAVENOUS at 15:50

## 2022-02-28 RX ADMIN — ACETAMINOPHEN 650 MG: 325 TABLET ORAL at 21:00

## 2022-02-28 NOTE — PROGRESS NOTES
Bedside shift change report received from [facility] [place] [facility]UELA JAMES. Report included the following information SBAR, Kardex, Procedure Summary, Intake/Output, MAR and Recent Results.

## 2022-02-28 NOTE — PROGRESS NOTES
[address] [facility] Adult  Hospitalist Group                                                                                          Hospitalist Progress Note  Teresa Collier MD  Answering service: [phone] from in house phone        Date of Service:  2022  NAME:  Fatimah Leavitt  :  1970  MRN:  [id number]      Admission Summary:   49year old male with a PMH of HTN, anxiety, and alcohol abuse. He presented to [facility] ED for weakness/dizziness. He stated that 1 week ago his appetite declined and throughout the week his inappetance continued while he hydrated with approximately 5 water bottles per day in addition to the 12 cans of beer he drinks at night. Patient states he suffers from insomnia and anxiety and he uses the beer to self medicate at night. Neither him nor his wife seemed to think this was a problem since he never appeared drunk, woke up hung over, or missed any days of work due to his drinking. There have been several falls at home recently. He currently takes Lisinopril and Hydrochlorothiazide for his HTN and reports he sees his physician every 6 months to have this evaluated. His chemistry revealed significant hyponatremia at 111. He received a 3% sodium chloride bolus with frequent monitoring of his sodium levels. He was hypertensive as well.  Critical care was consulted and patient will be transferred to the ICU for monitoring while his sodium levels are corrected.  Patient did note that he had a similar episode in January with lower sodium levels and his physician had recommended gatorade to help replenish his electrolytes.        Interval history / Subjective:   Feel well today  Denies withdrawals symptoms including  Palpitations, anxiety, hallucinations, N/V, nervousness   BP and HR better today   Required 5 mg of Ativan over last 24 hrs      Assessment & Plan:        Hyponatremia, symptomatic -improved   Alcohol abuse  HTN  Sinus tachycardia  Acute metabolic encephalopathy could be from both Etoh withdrawal and hyponatremia  -improved mentating at baseline  Tx to step down  Cont BMP q8h  Avoid over correction of Na level (goal 6 mmol/L q24hrs)  Given DDAVP on 2/24  Given 3% saline on admission, Hold off further hypertonic saline  On fluid restriction  Cont Ure Na  Avoid HCTZ   Nephro consulted and following, appreciate [facility] protocol. Last day of Alcohol is 2/23. Cont B1 and Folic acid  ECHO given sinus tachycardia > pending   Monitor BP     Code status: full   Prophylaxis:   Care Plan discussed with: RN. Pt and wife   Anticipated Disposition: home      Hospital Problems  Never Reviewed          Codes Class Noted POA    Hyponatremia ICD-10-CM: E87.1  ICD-9-CM: 276.1  2/23/2022 Unknown                Review of Systems:   A comprehensive review of systems was negative except for that written in the HPI. Vital Signs:    Last 24hrs VS reviewed since prior progress note. Most recent are:  Visit Vitals  /70   Pulse 92   Temp 98 °F (36.7 °C)   Resp 16   Ht 5' 8\" (1.727 m)   Wt 85 kg (187 lb 6.3 oz)   SpO2 99%   BMI 28.49 kg/m²         Intake/Output Summary (Last 24 hours) at 2/28/2022 1032  Last data filed at 2/27/2022 2300  Gross per 24 hour   Intake 770 ml   Output 300 ml   Net 470 ml        Physical Examination:     I had a face to face encounter with this patient and independently examined them on 2/28/2022 as outlined below:          Constitutional:  No acute distress, cooperative, pleasant    ENT:  Oral mucosa moist, oropharynx benign. Resp:  CTA bilaterally. No wheezing/rhonchi/rales. No accessory muscle use. CV:  Regular rhythm, normal rate, no murmurs, gallops, rubs    GI:  Soft, non distended, non tender. normoactive bowel sounds, no hepatosplenomegaly     Musculoskeletal:  No edema, warm, 2+ pulses throughout    Neurologic:  Moves all extremities.   AAOx3, CN II-XII reviewed            Data Review:    Review and/or order of clinical lab test  Review and/or order of tests in the radiology section of CPT  Review and/or order of tests in the medicine section of CPT  Review and summarization of old records and/or obtaining history from someone other     Labs:     No results for input(s): WBC, HGB, HCT, PLT, HGBEXT, HCTEXT, PLTEXT, HGBEXT, HCTEXT, PLTEXT in the last 72 hours. Recent Labs     02/28/22  0533 02/27/22  2212 02/27/22  1356 02/26/22  1408 02/26/22  0551 02/25/22 2022 02/25/22 2022   * 128* 128*   < > 123*   < > 123*   K 3.8 3.7 4.6   < > 3.9   < > 4.2   CL 99 98 95*   < > 93*   < > 91*   CO2 26 23 29   < > 25   < > 29   BUN 24* 29* 30*   < > 22*   < > 34*   CREA 0.65* 0.72 0.85   < > 0.71   < > 0.80   GLU 99 113* 128*   < > 115*   < > 116*   CA 9.2 9.3 9.4   < > 9.5   < > 9.2   PHOS 4.3  --   --   --  3.9  --  3.5    < > = values in this interval not displayed. Recent Labs     02/28/22  0533 02/26/22  0551 02/25/22 2022   ALB 3.5 3.8 4.0     No results for input(s): INR, PTP, APTT, INREXT, INREXT in the last 72 hours. No results for input(s): FE, TIBC, PSAT, FERR in the last 72 hours. No results found for: FOL, RBCF   No results for input(s): PH, PCO2, PO2 in the last 72 hours. No results for input(s): CPK, CKNDX, TROIQ in the last 72 hours.     No lab exists for component: CPKMB  No results found for: CHOL, CHOLX, CHLST, CHOLV, HDL, HDLP, LDL, LDLC, DLDLP, TGLX, TRIGL, TRIGP, CHHD, CHHDX  No results found for: [facility]  Lab Results   Component Value Date/Time    Color YELLOW/STRAW 02/23/2022 01:03 PM    Appearance CLEAR 02/23/2022 01:03 PM    Specific gravity 1.009 02/23/2022 01:03 PM    pH (UA) 8.0 02/23/2022 01:03 PM    Protein Negative 02/23/2022 01:03 PM    Glucose Negative 02/23/2022 01:03 PM    Ketone 15 (A) 02/23/2022 01:03 PM    Bilirubin Negative 02/23/2022 01:03 PM    Urobilinogen 1.0 02/23/2022 01:03 PM    Nitrites Negative 02/23/2022 01:03 PM    Leukocyte Esterase Negative 02/23/2022 01:03 PM    Epithelial cells FEW 02/23/2022 01:03 PM    Bacteria Negative 02/23/2022 01:03 PM    WBC 0-4 02/23/2022 01:03 PM    RBC 0-5 02/23/2022 01:03 PM         Medications Reviewed:     Current Facility-Administered Medications   Medication Dose Route Frequency    urea (URE-NA) 15 gram packet 1 Packet  1 Packet Oral BID    sodium chloride (NS) flush 5-40 mL  5-40 mL IntraVENous Q8H    sodium chloride (NS) flush 5-40 mL  5-40 mL IntraVENous PRN    sodium chloride (NS) flush 5-40 mL  5-40 mL IntraVENous Q8H    sodium chloride (NS) flush 5-40 mL  5-40 mL IntraVENous PRN    acetaminophen (TYLENOL) tablet 650 mg  650 mg Oral Q6H PRN    Or    acetaminophen (TYLENOL) suppository 650 mg  650 mg Rectal Q6H PRN    enoxaparin (LOVENOX) injection 40 mg  40 mg SubCUTAneous DAILY    ondansetron (ZOFRAN) injection 4 mg  4 mg IntraVENous Q6H PRN    bisacodyL (DULCOLAX) tablet 5 mg  5 mg Oral DAILY PRN    LORazepam (ATIVAN) tablet 1 mg  1 mg Oral Q1H PRN    LORazepam (ATIVAN) tablet 2 mg  2 mg Oral K3K PRN    folic acid (FOLVITE) tablet 1 mg  1 mg Oral DAILY    multivitamin, tx-iron-ca-min (THERA-M w/ IRON) tablet 1 Tablet  1 Tablet Oral DAILY    hydrALAZINE (APRESOLINE) 20 mg/mL injection 10-20 mg  10-20 mg IntraVENous Q6H PRN    melatonin tablet 9 mg  9 mg Oral QHS PRN     ______________________________________________________________________  EXPECTED LENGTH OF STAY: - - -  ACTUAL LENGTH OF STAY:          5                 Sonja Lowe MD

## 2022-02-28 NOTE — PROGRESS NOTES
Name: Jeevan He MRN: [id number]   : 1970 Hospital: [facility]   Date: 2022        IMPRESSION:   · Hyponatremia from excessive EtOH use. [place]. Na is gradually improving  · Normal renal function  · HTN - BP is at target      PLAN:   · Continue current management  · If and when na is above 130 patient may be considered for discharge. · [place] na in diet     Subjective/Interval History:   I have reviewed the flowsheet and previous days notes. ROS:Pertinent items are noted in HPI. Patient feels well. He is thirsty    Objective:   Vital Signs:    Visit Vitals  /70   Pulse 92   Temp 98 °F (36.7 °C)   Resp 16   Ht 5' 8\" (1.727 m)   Wt 85 kg (187 lb 6.3 oz)   SpO2 99%   BMI 28.49 kg/m²       O2 Device: None (Room air)   O2 Flow Rate (L/min): 2 l/min   Temp (24hrs), Av.2 °F (36.8 °C), Min:98 °F (36.7 °C), Max:98.5 °F (36.9 °C)       Intake/Output:   Last shift:      No intake/output data recorded. Last 3 shifts:  1901 -  0700  In: 1300 [P.O.:1300]  Out: 1075 [Urine:1075]    Intake/Output Summary (Last 24 hours) at 2022 1127  Last data filed at 2022 2300  Gross per 24 hour   Intake 770 ml   Output 300 ml   Net 470 ml        Physical Exam:  General:    Alert, cooperative, no distress, appears stated age. Head:   Normocephalic, without obvious abnormality, atraumatic. Eyes:   Conjunctivae/corneas clear. Nose:  Nares normal. No drainage or sinus tenderness. Throat:    Lips, mucosa, and tongue normal.  No Thrush  Neck:  Supple, symmetrical,  no adenopathy, thyroid: non tender    no carotid bruit and no JVD. Lungs:   Clear to auscultation bilaterally. No Wheezing or Rhonchi. No rales. Chest wall:  No tenderness or deformity. No Accessory muscle use. Heart:   Regular rate and rhythm,  no murmur, rub or gallop. Abdomen:   Soft, non-tender. Not distended. Bowel sounds normal. No masses  Extremities: Extremities normal, atraumatic, No cyanosis.   No edema. No clubbing  Skin:     Texture, turgor normal. No rashes or lesions. Not Jaundiced  Psych:  Good insight. Not depressed. Not anxious or agitated. Neurologic: Normal strength, Alert and oriented X 3. DATA:  Labs:  Recent Labs     02/28/22  0533 02/27/22  2212 02/27/22  1356 02/26/22  1408 02/26/22  0551 02/25/22 2022 02/25/22 2022   * 128* 128*   < > 123*   < > 123*   K 3.8 3.7 4.6   < > 3.9   < > 4.2   CL 99 98 95*   < > 93*   < > 91*   CO2 26 23 29   < > 25   < > 29   BUN 24* 29* 30*   < > 22*   < > 34*   CREA 0.65* 0.72 0.85   < > 0.71   < > 0.80   CA 9.2 9.3 9.4   < > 9.5   < > 9.2   ALB 3.5  --   --   --  3.8  --  4.0   PHOS 4.3  --   --   --  3.9  --  3.5    < > = values in this interval not displayed. No results for input(s): WBC, HGB, HCT, PLT, HGBEXT, HCTEXT, PLTEXT in the last 72 hours. No results for input(s): UMESH, KU, CLU, CREAU in the last 72 hours.     No lab exists for component: PROU    Total time spent with patient:  35 minutes    [] Critical Care Provided    Care Plan discussed with:   Staff, Medical Team    Cedric Laguna MD

## 2022-03-01 VITALS
RESPIRATION RATE: 16 BRPM | HEIGHT: 68 IN | SYSTOLIC BLOOD PRESSURE: 160 MMHG | DIASTOLIC BLOOD PRESSURE: 76 MMHG | WEIGHT: 187.39 LBS | TEMPERATURE: 97.6 F | HEART RATE: 84 BPM | BODY MASS INDEX: 28.4 KG/M2 | OXYGEN SATURATION: 96 %

## 2022-03-01 LAB
ALBUMIN SERPL-MCNC: 3.5 G/DL (ref 3.5–5)
ANION GAP SERPL CALC-SCNC: 3 MMOL/L (ref 5–15)
ANION GAP SERPL CALC-SCNC: 4 MMOL/L (ref 5–15)
BUN SERPL-MCNC: 35 MG/DL (ref 6–20)
BUN SERPL-MCNC: 39 MG/DL (ref 6–20)
BUN/CREAT SERPL: 42 (ref 12–20)
BUN/CREAT SERPL: 50 (ref 12–20)
CALCIUM SERPL-MCNC: 8.8 MG/DL (ref 8.5–10.1)
CALCIUM SERPL-MCNC: 9.3 MG/DL (ref 8.5–10.1)
CHLORIDE SERPL-SCNC: 100 MMOL/L (ref 97–108)
CHLORIDE SERPL-SCNC: 99 MMOL/L (ref 97–108)
CO2 SERPL-SCNC: 24 MMOL/L (ref 21–32)
CO2 SERPL-SCNC: 28 MMOL/L (ref 21–32)
CREAT SERPL-MCNC: 0.7 MG/DL (ref 0.7–1.3)
CREAT SERPL-MCNC: 0.92 MG/DL (ref 0.7–1.3)
GLUCOSE SERPL-MCNC: 105 MG/DL (ref 65–100)
GLUCOSE SERPL-MCNC: 156 MG/DL (ref 65–100)
PHOSPHATE SERPL-MCNC: 3.9 MG/DL (ref 2.6–4.7)
POTASSIUM SERPL-SCNC: 3.9 MMOL/L (ref 3.5–5.1)
POTASSIUM SERPL-SCNC: 4.7 MMOL/L (ref 3.5–5.1)
SODIUM SERPL-SCNC: 128 MMOL/L (ref 136–145)
SODIUM SERPL-SCNC: 130 MMOL/L (ref 136–145)

## 2022-03-01 PROCEDURE — 74011250636 HC RX REV CODE- 250/636: Performed by: INTERNAL MEDICINE

## 2022-03-01 PROCEDURE — 74011250636 HC RX REV CODE- 250/636: Performed by: NURSE PRACTITIONER

## 2022-03-01 PROCEDURE — 74011250637 HC RX REV CODE- 250/637: Performed by: NURSE PRACTITIONER

## 2022-03-01 PROCEDURE — 74011000250 HC RX REV CODE- 250: Performed by: EMERGENCY MEDICINE

## 2022-03-01 PROCEDURE — 74011250637 HC RX REV CODE- 250/637: Performed by: INTERNAL MEDICINE

## 2022-03-01 PROCEDURE — 36415 COLL VENOUS BLD VENIPUNCTURE: CPT

## 2022-03-01 PROCEDURE — 80069 RENAL FUNCTION PANEL: CPT

## 2022-03-01 PROCEDURE — 74011250637 HC RX REV CODE- 250/637: Performed by: STUDENT IN AN ORGANIZED HEALTH CARE EDUCATION/TRAINING PROGRAM

## 2022-03-01 PROCEDURE — 74011000250 HC RX REV CODE- 250: Performed by: NURSE PRACTITIONER

## 2022-03-01 PROCEDURE — 80048 BASIC METABOLIC PNL TOTAL CA: CPT

## 2022-03-01 RX ORDER — FUROSEMIDE 10 MG/ML
20 INJECTION INTRAMUSCULAR; INTRAVENOUS ONCE
Status: COMPLETED | OUTPATIENT
Start: 2022-03-01 | End: 2022-03-01

## 2022-03-01 RX ADMIN — Medication 1 PACKET: at 08:51

## 2022-03-01 RX ADMIN — Medication 100 MG: at 08:51

## 2022-03-01 RX ADMIN — SODIUM CHLORIDE, PRESERVATIVE FREE 10 ML: 5 INJECTION INTRAVENOUS at 08:52

## 2022-03-01 RX ADMIN — FOLIC ACID 1 MG: 1 TABLET ORAL at 08:51

## 2022-03-01 RX ADMIN — FUROSEMIDE 20 MG: 10 INJECTION, SOLUTION INTRAMUSCULAR; INTRAVENOUS at 12:37

## 2022-03-01 RX ADMIN — ENOXAPARIN SODIUM 40 MG: 100 INJECTION SUBCUTANEOUS at 08:51

## 2022-03-01 RX ADMIN — Medication 1 TABLET: at 08:51

## 2022-03-01 RX ADMIN — SODIUM CHLORIDE, PRESERVATIVE FREE 10 ML: 5 INJECTION INTRAVENOUS at 08:53

## 2022-03-01 NOTE — DISCHARGE INSTRUCTIONS
Barron Moise MD   Physician   Internal Medicine   Discharge Summary       Signed   Date of Service:  03/01/22 1320                       []Rosanna copied text    []Renan for details       Discharge Summary         PATIENT ID: Jeevan He  MRN: [id number]   YOB: 1970    DATE OF ADMISSION: 2/23/2022  1:52 PM    DATE OF DISCHARGE: 03/01/22   PRIMARY CARE PROVIDER: Gloria Martinez MD      ATTENDING PHYSICIAN: Chidi Cunningham MD  DISCHARGING PROVIDER: Chidi Cunningham MD    To contact this individual call [phone] and ask the  to page. If unavailable ask to be transferred the Adult Hospitalist Department.     CONSULTATIONS: IP CONSULT TO NEPHROLOGY     PROCEDURES/SURGERIES: * No surgery found *     ADMITTING DIAGNOSES & HOSPITAL COURSE:   HPI: \"47 year old male with a PMH of HTN, anxiety, and alcohol abuse. He presented to [facility] ED for weakness/dizziness. He stated that 1 week ago his appetite declined and throughout the week his inappetance continued while he hydrated with approximately 5 water bottles per day in addition to the 12 cans of beer he drinks at night. Patient states he suffers from insomnia and anxiety and he uses the beer to self medicate at night. Neither him nor his wife seemed to think this was a problem since he never appeared drunk, woke up hung over, or missed any days of work due to his drinking. There have been several falls at home recently. He currently takes Lisinopril and Hydrochlorothiazide for his HTN and reports he sees his physician every 6 months to have this evaluated. His chemistry revealed significant hyponatremia at 111. He received a 3% sodium chloride bolus with frequent monitoring of his sodium levels. He was hypertensive as well.  Critical care was consulted and patient will be transferred to the ICU for monitoring while his sodium levels are corrected.  Patient did note that he had a similar episode in January with lower sodium levels and his physician had recommended gatorade to help replenish his electrolytes. \"     Patient was managed for hyponatremia, alcohol withdrawal.  Nephrology evaluated and following, patient was given DDAVP 2/24 and 3% saline on admission with continued urea, fluid restriction with improvement in sodium. Spoke to nephrology today continue urea packets on discharge for 1 week and to repeat sodium outpatient with PCP. She has not required Ativan in over 24 hours. TTE was done for evaluation of sinus tachycardia which was like a second alcohol withdrawal which was EF of 55 to 60% with no significant valvulopathy. Follow-up with PCP outpatient 3 to 5 days. Stressed importance of alcohol abstinence. DC home today.               DISCHARGE DIAGNOSES / PLAN:          Hyponatremia, symptomatic -improved  Na stable at 130 today   Alcohol abuse  HTN  Sinus tachycardia improved   Acute metabolic encephalopathy could be from both Etoh withdrawal and hyponatremia  -improved mentating at baseline  Tx to step down  Cont BMP q8h  Avoid over correction of Na level (goal 6 mmol/L q24hrs)  Given DDAVP on 2/24  Given 3% saline on admission, Hold off further hypertonic saline  On fluid restriction  Cont Ure Na  Avoid HCTZ   Nephro consulted and following, appreciate recs   [facility] protocol. Last day of Alcohol is 2/23. Cont B1 and Folic acid  ECHO given sinus tachycardia > EF 55-60% no significant valvular  Monitor BP. Hold HCTZ on discharge> can worsen hyponatremia     Code status: full   Prophylaxis:   Care Plan discussed with: RN.  Pt and wife   Anticipated Disposition: home                FOLLOW UP APPOINTMENTS:             Follow-up Information      Follow up With Specialties Details Why Contact Info     Fifi Thurman MD Family Medicine In 3 days   [address]  [facility]        Nico Garcia MD Nephrology In 1 week   [address] [phone]  [phone]           ADDITIONAL CARE RECOMMENDATIONS: Please repeat BMP within 1 week with outpatient PCP.     Abstain from EtOH use     DIET: Resume previous diet      ACTIVITY: Activity as tolerated              DISCHARGE MEDICATIONS:        Current Discharge Medication List             START taking these medications     Details   urea (URE-NA) 15 gram packet Take 1 Packet by mouth daily for 7 days. Qty: 7 Packet, Refills: 0  Start date: 3/1/2022, End date: 3/8/2022                 CONTINUE these medications which have NOT CHANGED     Details   lisinopriL (PRINIVIL, ZESTRIL) 40 mg tablet Take 40 mg by mouth daily.                STOP taking these medications         hydroCHLOROthiazide (HYDRODIURIL) 12.5 mg tablet Comments:   Reason for Stopping:                       NOTIFY YOUR PHYSICIAN FOR ANY OF THE FOLLOWING:   Fever over 101 degrees for 24 hours. Chest pain, shortness of breath, fever, chills, nausea, vomiting, diarrhea, change in mentation, falling, weakness, bleeding. Severe pain or pain not relieved by medications. Or, any other signs or symptoms that you may have questions about.     DISPOSITION:    Home With:    OT   PT   HH   RN        Long term SNF/Inpatient Rehab   x Independent/assisted living     Hospice     Other:         PATIENT CONDITION AT DISCHARGE:      Functional status     Poor      Deconditioned    x Independent       Cognition    x Lucid      Forgetful      Dementia          Code status    x Full code      DNR       PHYSICAL EXAMINATION AT DISCHARGE:  I had a face to face encounter with this patient and independently examined them on 3/1/2022 as outlined below:                                                   Constitutional:  No acute distress, cooperative, pleasant    ENT:  Oral mucosa moist, oropharynx benign. Resp:  CTA bilaterally. No wheezing/rhonchi/rales. No accessory muscle use. CV:  Regular rhythm, normal rate, no murmurs, gallops, rubs    GI:  Soft, non distended, non tender. normoactive bowel sounds, no hepatosplenomegaly     Musculoskeletal:  No edema, warm, 2+ pulses throughout    Neurologic:  Moves all extremities.  AAOx3, CN II-XII reviewed               CHRONIC MEDICAL DIAGNOSES:             Problem List as of 3/1/2022 Never Reviewed           Codes Class Noted - Resolved     Hyponatremia ICD-10-CM: E87.1  ICD-9-CM: 276.1   2/23/2022 - Present                   Greater than 30 minutes were spent with the patient on counseling and coordination of care     Signed:   Erica Bourgeois MD  3/1/2022  1:20 PM                 Routing History                      Malik Sellers MD   Physician   Internal Medicine   Discharge Summary       Signed   Date of Service:  03/01/22 1320                       []Hide copied text    []Renan for details       Discharge Summary         PATIENT ID: Juan West  MRN: [id number]   YOB: 1970    DATE OF ADMISSION: 2/23/2022  1:52 PM    DATE OF DISCHARGE: 03/01/22   PRIMARY CARE PROVIDER: Elina Loza MD      ATTENDING PHYSICIAN: Erica Bourgeois MD  DISCHARGING PROVIDER: Erica Bourgeois MD    To contact this individual call [phone] and ask the  to page. If unavailable ask to be transferred the Adult Hospitalist Department.     CONSULTATIONS: IP CONSULT TO NEPHROLOGY     PROCEDURES/SURGERIES: * No surgery found *     ADMITTING DIAGNOSES & HOSPITAL COURSE:   HPI: \"47 year old male with a PMH of HTN, anxiety, and alcohol abuse. He presented [facility] for weakness/dizziness. He stated that 1 week ago his appetite declined and throughout the week his inappetance continued while he hydrated with approximately 5 water bottles per day in addition to the 12 cans of beer he drinks at night. Patient states he suffers from insomnia and anxiety and he uses the beer to self medicate at night.  Neither him nor his wife seemed to think this was a problem since he never appeared drunk, woke up hung over, or missed any days of work due to his drinking. There have been several falls at home recently. He currently takes Lisinopril and Hydrochlorothiazide for his HTN and reports he sees his physician every 6 months to have this evaluated. His chemistry revealed significant hyponatremia at 111. He received a 3% sodium chloride bolus with frequent monitoring of his sodium levels. He was hypertensive as well.  Critical care was consulted and patient will be transferred to the ICU for monitoring while his sodium levels are corrected. Patient did note that he had a similar episode in January with lower sodium levels and his physician had recommended gatorade to help replenish his electrolytes. \"     Patient was managed for hyponatremia, alcohol withdrawal.  Nephrology evaluated and following, patient was given DDAVP 2/24 and 3% saline on admission with continued urea, fluid restriction with improvement in sodium. Spoke to nephrology today continue urea packets on discharge for 1 week and to repeat sodium outpatient with PCP. She has not required Ativan in over 24 hours. TTE was done for evaluation of sinus tachycardia which was like a second alcohol withdrawal which was EF of 55 to 60% with no significant valvulopathy. Follow-up with PCP outpatient 3 to 5 days. Stressed importance of alcohol abstinence. DC home today.               DISCHARGE DIAGNOSES / PLAN:          Hyponatremia, symptomatic -improved  Na stable at 130 today   Alcohol abuse  HTN  Sinus tachycardia improved   Acute metabolic encephalopathy could be from both Etoh withdrawal and hyponatremia  -improved mentating at baseline  Tx to step down  Cont BMP q8h  Avoid over correction of Na level (goal 6 mmol/L q24hrs)  Given DDAVP on 2/24  Given 3% saline on admission, Hold off further hypertonic saline  On fluid restriction  Cont Ure Na  Avoid HCTZ   Nephro consulted and following, appreciate [facility] protocol. Last day of Alcohol is 2/23.  Cont B1 and Folic acid  ECHO given sinus tachycardia > EF 55-60% no significant valvular  Monitor BP. Hold HCTZ on discharge> can worsen hyponatremia     Code status: full   Prophylaxis:   Care Plan discussed with: RN. Pt and wife   Anticipated Disposition: home                FOLLOW UP APPOINTMENTS:             Follow-up Information      Follow up With Specialties Details Why Contact Info     Jessica Almeida MD Family Medicine In 3 days   [address]  [facility]        Esperanza Cadet MD Nephrology In 1 week   [address] [place] [address] [address] [phone]                ADDITIONAL CARE RECOMMENDATIONS: Please repeat BMP within 1 week with outpatient PCP.     Abstain from EtOH use     DIET: Resume previous diet      ACTIVITY: Activity as tolerated              DISCHARGE MEDICATIONS:        Current Discharge Medication List             START taking these medications     Details   urea (URE-NA) 15 gram packet Take 1 Packet by mouth daily for 7 days. Qty: 7 Packet, Refills: 0  Start date: 3/1/2022, End date: 3/8/2022                 CONTINUE these medications which have NOT CHANGED     Details   lisinopriL (PRINIVIL, ZESTRIL) 40 mg tablet Take 40 mg by mouth daily.                STOP taking these medications         hydroCHLOROthiazide (HYDRODIURIL) 12.5 mg tablet Comments:   Reason for Stopping:                       NOTIFY YOUR PHYSICIAN FOR ANY OF THE FOLLOWING:   Fever over 101 degrees for 24 hours. Chest pain, shortness of breath, fever, chills, nausea, vomiting, diarrhea, change in mentation, falling, weakness, bleeding. Severe pain or pain not relieved by medications.   Or, any other signs or symptoms that you may have questions about.     DISPOSITION:    Home With:    OT   PT   HH   RN        Long term SNF/Inpatient Rehab   x Independent/assisted living     Hospice     Other:         PATIENT CONDITION AT DISCHARGE:      Functional status     Poor      Deconditioned    x Independent    Cognition    x Lucid      Forgetful      Dementia          Code status    x Full code      DNR       PHYSICAL EXAMINATION AT DISCHARGE:  I had a face to face encounter with this patient and independently examined them on 3/1/2022 as outlined below:                                                   Constitutional:  No acute distress, cooperative, pleasant    ENT:  Oral mucosa moist, oropharynx benign. Resp:  CTA bilaterally. No wheezing/rhonchi/rales. No accessory muscle use. CV:  Regular rhythm, normal rate, no murmurs, gallops, rubs    GI:  Soft, non distended, non tender. normoactive bowel sounds, no hepatosplenomegaly     Musculoskeletal:  No edema, warm, 2+ pulses throughout    Neurologic:  Moves all extremities.   AAOx3, CN II-XII reviewed               CHRONIC MEDICAL DIAGNOSES:             Problem List as of 3/1/2022 Never Reviewed           Codes Class Noted - Resolved     Hyponatremia ICD-10-CM: E87.1  ICD-9-CM: 276.1   2/23/2022 - Present                   Greater than 30 minutes were spent with the patient on counseling and coordination of care     Signed:   Leandro Andersen MD  3/1/2022  1:20 PM                 Routing History

## 2022-03-01 NOTE — PROGRESS NOTES
Hospital follow-up PCP transitional care appointment has been scheduled with Dr. Delmar Fish for Tuesday, 3/15/22 at 3:45 p.m. This is the earliest appointment available. Pending patient discharge.   Magali Faulkner, Care Management Specialist.

## 2022-03-01 NOTE — DISCHARGE SUMMARY
Discharge Summary       PATIENT ID: Jorje Garcia  MRN: [id number]   YOB: 1970    DATE OF ADMISSION: 2/23/2022  1:52 PM    DATE OF DISCHARGE: 03/01/22   PRIMARY CARE PROVIDER: Ear Barrera MD     ATTENDING PHYSICIAN: Chioma Gonzalez MD  DISCHARGING PROVIDER: Chioma Gonzalez MD    To contact this individual call [phone] and ask the  to page. If unavailable ask to be transferred the Adult Hospitalist Department. CONSULTATIONS: IP CONSULT TO NEPHROLOGY    PROCEDURES/SURGERIES: * No surgery found *    ADMITTING DIAGNOSES & HOSPITAL COURSE:   HPI: \"47 year old male with a PMH of HTN, anxiety, and alcohol abuse. He presented to [facility] ED for weakness/dizziness. He stated that 1 week ago his appetite declined and throughout the week his inappetance continued while he hydrated with approximately 5 water bottles per day in addition to the 12 cans of beer he drinks at night. Patient states he suffers from insomnia and anxiety and he uses the beer to self medicate at night. Neither him nor his wife seemed to think this was a problem since he never appeared drunk, woke up hung over, or missed any days of work due to his drinking. There have been several falls at home recently. He currently takes Lisinopril and Hydrochlorothiazide for his HTN and reports he sees his physician every 6 months to have this evaluated. His chemistry revealed significant hyponatremia at 111. He received a 3% sodium chloride bolus with frequent monitoring of his sodium levels. He was hypertensive as well.  Critical care was consulted and patient will be transferred to the ICU for monitoring while his sodium levels are corrected.  Patient did note that he had a similar episode in January with lower sodium levels and his physician had recommended gatorade to help replenish his electrolytes. \"    Patient was managed for hyponatremia, alcohol withdrawal.  Nephrology evaluated and following, patient was given DDAVP 2/24 and 3% saline on admission with continued urea, fluid restriction with improvement in sodium. Spoke to nephrology today continue urea packets on discharge for 1 week and to repeat sodium outpatient with PCP. She has not required Ativan in over 24 hours. TTE was done for evaluation of sinus tachycardia which was like a second alcohol withdrawal which was EF of 55 to 60% with no significant valvulopathy. Follow-up with PCP outpatient 3 to 5 days. Stressed importance of alcohol abstinence. DC home today.            DISCHARGE DIAGNOSES / PLAN:        Hyponatremia, symptomatic -improved  Na stable at 130 today   Alcohol abuse  HTN  Sinus tachycardia improved   Acute metabolic encephalopathy could be from both Etoh withdrawal and hyponatremia  -improved mentating at baseline  Tx to step down  Cont BMP q8h  Avoid over correction of Na level (goal 6 mmol/L q24hrs)  Given DDAVP on 2/24  Given 3% saline on admission, Hold off further hypertonic saline  On fluid restriction  Cont Ure Na  Avoid HCTZ   Nephro consulted and following, appreciate [facility] protocol. Last day of Alcohol is 2/23. Cont B1 and Folic acid  ECHO given sinus tachycardia > EF 55-60% no significant valvular  Monitor BP. Hold HCTZ on discharge> can worsen hyponatremia     Code status: full   Prophylaxis:   Care Plan discussed with: RN. Pt and wife   Anticipated Disposition: home            FOLLOW UP APPOINTMENTS:    Follow-up Information     Follow up With Specialties Details Why Contact Info    Jonathan Mckeon MD Family Medicine In 3 days  [facility]  [address]      Lindsay Cintron MD Nephrology In 1 week  [address] [phone]             ADDITIONAL CARE RECOMMENDATIONS: Please repeat BMP within 1 week with outpatient PCP.     Abstain from EtOH use    DIET: Resume previous diet     ACTIVITY: Activity as tolerated          DISCHARGE MEDICATIONS:  Current Discharge Medication List      START taking these medications    Details   urea (URE-NA) 15 gram packet Take 1 Packet by mouth daily for 7 days. Qty: 7 Packet, Refills: 0  Start date: 3/1/2022, End date: 3/8/2022         CONTINUE these medications which have NOT CHANGED    Details   lisinopriL (PRINIVIL, ZESTRIL) 40 mg tablet Take 40 mg by mouth daily. STOP taking these medications       hydroCHLOROthiazide (HYDRODIURIL) 12.5 mg tablet Comments:   Reason for Stopping:                 NOTIFY YOUR PHYSICIAN FOR ANY OF THE FOLLOWING:   Fever over 101 degrees for 24 hours. Chest pain, shortness of breath, fever, chills, nausea, vomiting, diarrhea, change in mentation, falling, weakness, bleeding. Severe pain or pain not relieved by medications. Or, any other signs or symptoms that you may have questions about. DISPOSITION:    Home With:   OT  PT  HH  RN       Long term SNF/Inpatient Rehab   x Independent/assisted living    Hospice    Other:       PATIENT CONDITION AT DISCHARGE:     Functional status    Poor     Deconditioned    x Independent      Cognition    x Lucid     Forgetful     Dementia        Code status    x Full code     DNR      PHYSICAL EXAMINATION AT DISCHARGE:  I had a face to face encounter with this patient and independently examined them on 3/1/2022 as outlined below:                                                   Constitutional:  No acute distress, cooperative, pleasant    ENT:  Oral mucosa moist, oropharynx benign. Resp:  CTA bilaterally. No wheezing/rhonchi/rales. No accessory muscle use. CV:  Regular rhythm, normal rate, no murmurs, gallops, rubs    GI:  Soft, non distended, non tender. normoactive bowel sounds, no hepatosplenomegaly     Musculoskeletal:  No edema, warm, 2+ pulses throughout    Neurologic:  Moves all extremities.   AAOx3, CN II-XII reviewed           CHRONIC MEDICAL DIAGNOSES:  Problem List as of 3/1/2022 Never Reviewed          Codes Class Noted - Resolved Hyponatremia ICD-10-CM: E87.1  ICD-9-CM: 276.1  2/23/2022 - Present              Greater than 30 minutes were spent with the patient on counseling and coordination of care    Signed:   Enzo Coronado MD  3/1/2022  1:20 PM

## 2022-03-01 NOTE — PROGRESS NOTES
Bedside shift change report given to Tahira Espana RN (oncoming nurse) by Thom Galeana RN (offgoing nurse). Report included the following information SBAR, Kardex, ED Summary, Intake/Output, MAR and Cardiac Rhythm NSR.

## 2022-03-01 NOTE — ADT AUTH CERT NOTES
Systemic or Infectious Condition GRG - Care Day 6 (2/28/2022) by Katiana Sam RN       Review Status Review Entered   Completed 3/1/2022 13:09      Criteria Review      Care Day: 6 Care Date: 2/28/2022 Level of Care: Intermediate Care    Guideline Day 2    Level Of Care    (X) Floor    3/1/2022 13:09:42 EST by Vitor Sandhu      remote telemetry  98.3,96,15,148/103,97% ra    Clinical Status    (X) * No ICU or intermediate care needs    Interventions    (X) Inpatient interventions continue    3/1/2022 13:09:42 EST by Vitor Sandhu      lovenox 40 mg sq qd, apresoline 10-20 mg IV q6h prn SBP > 160 - x1,   na 129, k  4.4, anion gap  4, bun 36 crt  0.78, bun/crt ratio  46, GFR > 60  CIWA assessment, seizure precautions, scd's, regular diet 1000ml Fluid restriction    * Milestone   Additional Notes   IM Note -  Interval history / Subjective:   Feel well today   Denies withdrawals symptoms including  Palpitations, anxiety, hallucinations, N/V, nervousness    BP and HR better today    Required 5 mg of Ativan over last 24 hrs        Assessment & Plan:           Hyponatremia, symptomatic -improved    Alcohol abuse   HTN   Sinus tachycardia   Acute metabolic encephalopathy could be from both Etoh withdrawal and hyponatremia  -improved mentating at baseline   Tx to step down   Cont BMP q8h   Avoid over correction of Na level (goal 6 mmol/L q24hrs)   Given DDAVP on 2/24   Given 3% saline on admission, Hold off further hypertonic saline   On fluid restriction   Cont Ure Na   Avoid HCTZ    Nephro consulted and following, appreciate [facility] protocol. Last day of Alcohol is 2/23. Cont B1 and Folic acid   ECHO given sinus tachycardia > pending    Monitor BP       Code status: full    Prophylaxis:    Care Plan discussed with: RN. Pt and wife    Anticipated Disposition: home       Nephrology Note - Patient feels well. He is thirsty   IMPRESSION:   · Hyponatremia from excessive EtOH use. Potomania.  Na is gradually improving   · Normal renal function   · HTN - BP is at target       PLAN:   · Continue current management   · If and when na is above 130 patient may be considered for discharge. · [place] na in diet                           Systemic or Infectious Condition GRG - Care Day 5 (2/27/2022) by Renuka Saleh RN       Review Status Review Entered   Completed 2/28/2022 14:41      Criteria Review      Care Day: 5 Care Date: 2/27/2022 Level of Care: Intermediate Care    Guideline Day 2    Clinical Status    ( ) * No ICU or intermediate care needs    2/28/2022 14:38:02 EST by Renuka Saleh      Intermediate Care    Interventions    (X) Inpatient interventions continue    2/28/2022 14:38:02 EST by Renuka Saleh      Lovenox 40mg sc qd, Hydralazine 20mg IV x1, Ativan 1mg IV x1 and 2mg IV x1    Seizure Prec, Regular diet w/1000ml fluid restriction, Renal panel qd, BMP q8, I/O, Weigh qd, SCDs, Activity as ada w/assist    ( ) Transition to oral routes    2/28/2022 14:38:02 EST by Renuka Saleh      Folvite 1mg qd, Melatonin 9mg x1, Thera M w/iron qd, Ure Na 1 pkt bid, B1 200mg qd    * Milestone   Additional Notes   2/27/22        LOC: INPT    INTERMEDIATE CARE        98.1-94-10, 150/107, % ra    HR max 107; RR max 36        Na 126 - 128 - 128, Cl 96 - 95, Glu 108, BUN 23 - 30         NEPHROLOGY      Assessment:   Hyponatremia (Severe) - likely sec to low/poor solute intake associated with alcohol abuse. (The serum osmo was 240, urine osmo 288, urine creat 44, urine Na 38). Hypertension   Alcohol Abuse - with withdrawal           Plan:   Mr Ginger Moe received 3 % saline in the ER on 2/23. His serum sodium went from 111 (at 13:03 on 2/23)  --> 126  (2/27) this morning. His symptoms now seem consistent with alcohol withdrawal (tremors,visual hallucinations) vs symptomatic hyponatremia. Will hold further doses of 3 % saline    Continue Ure Na --> 15 grams po BID for now.  He is correcting at the recommended rate of    4 to 6 meq/L/day. Tolvaptan may be considered. Will hold off for now. Continue fluid restriction for now   Increase the solute intake of the diet. Hold lisinopril. Avoid HCTZ in the future. Continue sodium monitoring   Management of alcohol withdrawal will be deferred to the ICU team.   Abstinence from alcohol was stressed on Wed (2/23)         HOSPITALIST      Feel OK today   Denies withdrawals symptoms including  Palpitations, anxiety, hallucinations, N/V, nervousness    BP and HR better today    Required 3 mg of Ativan over last 24 hrs       Constitutional: No acute distress, cooperative, pleasant    ENT: Oral mucosa moist, oropharynx benign. Resp: CTA bilaterally. No wheezing/rhonchi/rales. No accessory muscle use. CV: Regular rhythm, normal rate, no murmurs, gallops, rubs    GI: Soft, non distended, non tender. normoactive bowel sounds, no hepatosplenomegaly     Musculoskeletal: No edema, warm, 2+ pulses throughout    Neurologic: Moves all extremities.  AAOx3, CN II-XII reviewed                            Assessment & Plan:           Hyponatremia, symptomatic    Alcohol abuse   HTN   Sinus tachycardia   AMS could be from both Etoh withdrawal and hyponatremia    Tx to step down   Cont BMP q8h   Avoid over correction of Na level (goal 6 mmol/L q24hrs)   Given DDAVP on 2/24   Given 3% saline on admission, Hold off further hypertonic saline   On fluid restriction   Cont Ure Na   Avoid HCTZ    CIWA protocol. Last day of Alcohol is 2/23.  Cont B1 and Folic acid   ECHO given sinus tachycardia    Monitor BP

## 2022-03-01 NOTE — PROGRESS NOTES
Name: Coleman Dominguez MRN: [id number]   : 1970 [facility]   Date: 3/1/2022        IMPRESSION:   · Hyponatremia from excessive EtOH use. Potomania. Na is a tad down. He is drinking a lot of fluids. · Normal renal function  · HTN - BP is at target      PLAN:   · Give a dose of Lasix and continue Ure Na  · If and when na is above 130 patient may be considered for discharge. · [place] na in diet     Subjective/Interval History:   I have reviewed the flowsheet and previous days notes. ROS:Pertinent items are noted in HPI. Patient feels well. He is thirsty     3/01/22 Patient is drinking a lot of water and sodas. He feels thirsty    Objective:   Vital Signs:    Visit Vitals  BP (!) 128/97 (BP 1 Location: Right upper arm, BP Patient Position: At rest)   Pulse 84   Temp 98.3 °F (36.8 °C)   Resp 14   Ht 5' 8\" (1.727 m)   Wt 85 kg (187 lb 6.3 oz)   SpO2 99%   BMI 28.49 kg/m²       O2 Device: None (Room air)   O2 Flow Rate (L/min): 2 l/min   Temp (24hrs), Av.2 °F (36.8 °C), Min:97.8 °F (36.6 °C), Max:98.4 °F (36.9 °C)       Intake/Output:   Last shift:      701 -  190  In: -   Out: 350 [Urine:350]  Last 3 shifts: 1901 -  0700  In: 1020 [P.O.:1020]  Out: 950 [Urine:950]    Intake/Output Summary (Last 24 hours) at 3/1/2022 1102  Last data filed at 3/1/2022 0854  Gross per 24 hour   Intake 800 ml   Output 1000 ml   Net -200 ml        Physical Exam:  General:    Alert, cooperative, no distress, appears stated age. Head:   Normocephalic, without obvious abnormality, atraumatic. Eyes:   Conjunctivae/corneas clear. Nose:  Nares normal. No drainage or sinus tenderness. Throat:    Lips, mucosa, and tongue normal.  No Thrush  Neck:  Supple, symmetrical,  no adenopathy, thyroid: non tender    no carotid bruit and no JVD. Lungs:   Clear to auscultation bilaterally. No Wheezing or Rhonchi. No rales. Chest wall:  No tenderness or deformity.  No Accessory muscle use.  Heart:   Regular rate and rhythm,  no murmur, rub or gallop. Abdomen:   Soft, non-tender. Not distended. Bowel sounds normal. No masses  Extremities: Extremities normal, atraumatic, No cyanosis. No edema. No clubbing  Skin:     Texture, turgor normal. No rashes or lesions. Not Jaundiced  Psych:  Good insight. Not depressed. Not anxious or agitated. Neurologic: Normal strength, Alert and oriented X 3. DATA:  Labs:  Recent Labs     03/01/22  0935 03/01/22  0016 02/28/22  1239 02/28/22  0533 02/28/22  0533   * 128* 129*   < > 129*   K 4.7 3.9 4.4   < > 3.8   CL 99 100 98   < > 99   CO2 28 24 27   < > 26   BUN 39* 35* 36*   < > 24*   CREA 0.92 0.70 0.78   < > 0.65*   CA 9.3 8.8 9.5   < > 9.2   ALB  --  3.5  --   --  3.5   PHOS  --  3.9  --   --  4.3    < > = values in this interval not displayed. No results for input(s): WBC, HGB, HCT, PLT, HGBEXT, HCTEXT, PLTEXT, HGBEXT, HCTEXT, PLTEXT in the last 72 hours. No results for input(s): UMESH, KU, CLU, CREAU in the last 72 hours.     No lab exists for component: PROU    Total time spent with patient:  35 minutes    [] Critical Care Provided    Care Plan discussed with:   Staff, Medical Team    Coral Langford MD

## 2022-03-19 PROBLEM — E87.1 HYPONATREMIA: Status: ACTIVE | Noted: 2022-02-23

## 2023-05-14 RX ORDER — LISINOPRIL 40 MG/1
40 TABLET ORAL DAILY
COMMUNITY

## 2024-02-10 ENCOUNTER — HOSPITAL ENCOUNTER (EMERGENCY)
Facility: HOSPITAL | Age: 54
Discharge: HOME | End: 2024-02-10
Attending: STUDENT IN AN ORGANIZED HEALTH CARE EDUCATION/TRAINING PROGRAM
Payer: COMMERCIAL

## 2024-02-10 ENCOUNTER — APPOINTMENT (OUTPATIENT)
Facility: HOSPITAL | Age: 54
End: 2024-02-10
Payer: COMMERCIAL

## 2024-02-10 VITALS
BODY MASS INDEX: 32.54 KG/M2 | SYSTOLIC BLOOD PRESSURE: 163 MMHG | HEART RATE: 72 BPM | RESPIRATION RATE: 20 BRPM | DIASTOLIC BLOOD PRESSURE: 102 MMHG | WEIGHT: 214.73 LBS | OXYGEN SATURATION: 100 % | HEIGHT: 68 IN | TEMPERATURE: 97.8 F

## 2024-02-10 DIAGNOSIS — N43.3 HYDROCELE OF TESTIS: Primary | ICD-10-CM

## 2024-02-10 LAB
ALBUMIN SERPL-MCNC: 3.9 G/DL (ref 3.5–5)
ALBUMIN/GLOB SERPL: 1.1 (ref 1.1–2.2)
ALP SERPL-CCNC: 64 U/L (ref 45–117)
ALT SERPL-CCNC: 17 U/L (ref 12–78)
ANION GAP SERPL CALC-SCNC: 5 MMOL/L (ref 5–15)
APPEARANCE UR: CLEAR
AST SERPL-CCNC: 18 U/L (ref 15–37)
BACTERIA URNS QL MICRO: NEGATIVE /HPF
BASOPHILS # BLD: 0 K/UL (ref 0–0.1)
BASOPHILS NFR BLD: 1 % (ref 0–1)
BILIRUB SERPL-MCNC: 0.8 MG/DL (ref 0.2–1)
BILIRUB UR QL: NEGATIVE
BUN SERPL-MCNC: 5 MG/DL (ref 6–20)
BUN/CREAT SERPL: 6 (ref 12–20)
CALCIUM SERPL-MCNC: 8.7 MG/DL (ref 8.5–10.1)
CHLORIDE SERPL-SCNC: 101 MMOL/L (ref 97–108)
CO2 SERPL-SCNC: 24 MMOL/L (ref 21–32)
COLOR UR: NORMAL
COMMENT:: NORMAL
CREAT SERPL-MCNC: 0.79 MG/DL (ref 0.7–1.3)
DIFFERENTIAL METHOD BLD: NORMAL
EOSINOPHIL # BLD: 0.3 K/UL (ref 0–0.4)
EOSINOPHIL NFR BLD: 5 % (ref 0–7)
EPITH CASTS URNS QL MICRO: NORMAL /LPF
ERYTHROCYTE [DISTWIDTH] IN BLOOD BY AUTOMATED COUNT: 12.3 % (ref 11.5–14.5)
GLOBULIN SER CALC-MCNC: 3.7 G/DL (ref 2–4)
GLUCOSE SERPL-MCNC: 92 MG/DL (ref 65–100)
GLUCOSE UR STRIP.AUTO-MCNC: NEGATIVE MG/DL
HCT VFR BLD AUTO: 40.4 % (ref 36.6–50.3)
HGB BLD-MCNC: 14.4 G/DL (ref 12.1–17)
HGB UR QL STRIP: NEGATIVE
HYALINE CASTS URNS QL MICRO: NORMAL /LPF (ref 0–5)
IMM GRANULOCYTES # BLD AUTO: 0 K/UL (ref 0–0.04)
IMM GRANULOCYTES NFR BLD AUTO: 0 % (ref 0–0.5)
KETONES UR QL STRIP.AUTO: NEGATIVE MG/DL
LEUKOCYTE ESTERASE UR QL STRIP.AUTO: NEGATIVE
LYMPHOCYTES # BLD: 1.5 K/UL (ref 0.8–3.5)
LYMPHOCYTES NFR BLD: 20 % (ref 12–49)
MCH RBC QN AUTO: 34 PG (ref 26–34)
MCHC RBC AUTO-ENTMCNC: 35.6 G/DL (ref 30–36.5)
MCV RBC AUTO: 95.5 FL (ref 80–99)
MONOCYTES # BLD: 0.5 K/UL (ref 0–1)
MONOCYTES NFR BLD: 7 % (ref 5–13)
NEUTS SEG # BLD: 4.8 K/UL (ref 1.8–8)
NEUTS SEG NFR BLD: 67 % (ref 32–75)
NITRITE UR QL STRIP.AUTO: NEGATIVE
NRBC # BLD: 0 K/UL (ref 0–0.01)
NRBC BLD-RTO: 0 PER 100 WBC
NT PRO BNP: 125 PG/ML
PH UR STRIP: 6 (ref 5–8)
PLATELET # BLD AUTO: 303 K/UL (ref 150–400)
PMV BLD AUTO: 9.3 FL (ref 8.9–12.9)
POTASSIUM SERPL-SCNC: 4.5 MMOL/L (ref 3.5–5.1)
PROT SERPL-MCNC: 7.6 G/DL (ref 6.4–8.2)
PROT UR STRIP-MCNC: NEGATIVE MG/DL
RBC # BLD AUTO: 4.23 M/UL (ref 4.1–5.7)
RBC #/AREA URNS HPF: NORMAL /HPF (ref 0–5)
SODIUM SERPL-SCNC: 130 MMOL/L (ref 136–145)
SP GR UR REFRACTOMETRY: <1.005 (ref 1–1.03)
SPECIMEN HOLD: NORMAL
SPECIMEN HOLD: NORMAL
UROBILINOGEN UR QL STRIP.AUTO: 0.2 EU/DL (ref 0.2–1)
WBC # BLD AUTO: 7.2 K/UL (ref 4.1–11.1)
WBC URNS QL MICRO: NORMAL /HPF (ref 0–4)

## 2024-02-10 PROCEDURE — 99284 EMERGENCY DEPT VISIT MOD MDM: CPT

## 2024-02-10 PROCEDURE — 85025 COMPLETE CBC W/AUTO DIFF WBC: CPT

## 2024-02-10 PROCEDURE — 81001 URINALYSIS AUTO W/SCOPE: CPT

## 2024-02-10 PROCEDURE — 80053 COMPREHEN METABOLIC PANEL: CPT

## 2024-02-10 PROCEDURE — 76870 US EXAM SCROTUM: CPT

## 2024-02-10 PROCEDURE — 83880 ASSAY OF NATRIURETIC PEPTIDE: CPT

## 2024-02-10 PROCEDURE — 36415 COLL VENOUS BLD VENIPUNCTURE: CPT

## 2024-02-10 ASSESSMENT — PAIN - FUNCTIONAL ASSESSMENT: PAIN_FUNCTIONAL_ASSESSMENT: NONE - DENIES PAIN

## 2024-02-10 ASSESSMENT — ENCOUNTER SYMPTOMS
SHORTNESS OF BREATH: 0
COUGH: 0
NAUSEA: 0
ABDOMINAL PAIN: 0
VOMITING: 0

## 2024-02-10 NOTE — ED PROVIDER NOTES
[facility] EMERGENCY DEP  EMERGENCY DEPARTMENT ENCOUNTER      Pt Name: Alexey Caballero  MRN: [id number]  Birthdate 1970  Date of evaluation: 2/10/2024  Provider: EFE Virk    CHIEF COMPLAINT     No chief complaint on file.        HISTORY OF PRESENT ILLNESS    Patient is a 52 yo male who presents to ED c/o left scrotal swelling which started a few weeks ago. Reports left scrotal swelling and discomfort that has been worsening over the past few days. States the area is uncomfortable, but not painful. Denies any fever, chills, erythema, warmth, abdominal pain, n/v/d, constipation, dysuria, urinary frequency, urgency, hematuria, flank pain. Patient reports he saw his PCP yesterday and was referred to ED for imaging.       Social History: Daily alcohol use - 4-5 beers per day, denies illicit drug use or tobacco use           Review of External Medical Records:     Nursing Notes were reviewed.    REVIEW OF SYSTEMS       Review of Systems   Constitutional:  Negative for activity change, appetite change and fever.   Respiratory:  Negative for cough and shortness of breath.    Cardiovascular:  Negative for chest pain, palpitations and leg swelling.   Gastrointestinal:  Negative for abdominal pain, nausea and vomiting.   Genitourinary:  Positive for scrotal swelling and testicular pain. Negative for decreased urine volume, difficulty urinating, dysuria, flank pain, frequency, hematuria, penile discharge, penile pain, penile swelling and urgency.   All other systems reviewed and are negative.      Except as noted above the remainder of the review of systems was reviewed and negative.       PAST MEDICAL HISTORY   No past medical history on file.      SURGICAL HISTORY     No past surgical history on file.      CURRENT MEDICATIONS       Previous Medications    LISINOPRIL (PRINIVIL;ZESTRIL) 40 MG TABLET    Take 1 tablet by mouth daily       ALLERGIES     Patient has no allergy information on record.    FAMILY HISTORY    testicular torsion.  Ddx includes hydrocele, varicocele, epididymitis, inguinal hernia, low suspicion of fluid overload given unilateral. UA, scrotum ultrasound and labs ordered.     Amount and/or Complexity of Data Reviewed  Labs: ordered.  Radiology: ordered.            REASSESSMENT            CONSULTS:  None    PROCEDURES:  Unless otherwise noted below, none     Procedures      FINAL IMPRESSION      1. Hydrocele of testis          DISPOSITION/PLAN   DISPOSITION Decision To Discharge 02/10/2024 01:43:49 PM      PATIENT REFERRED TO:  Low Licona Jr., MD  [address] [facility] [address]  [facility] [address] [phone]    Schedule an appointment as soon as possible for a visit       [facility]  [facility]  [address]   [facility] [address] [phone]  Schedule an appointment as soon as possible for a visit         DISCHARGE MEDICATIONS:  Discharge Medication List as of 2/10/2024  1:45 PM            (Please note that portions of this note were completed with a voice recognition program.  Efforts were made to edit the dictations but occasionally words are mis-transcribed.)    Vini Burton MD (electronically signed)  Physician Assistant        Vini Burton MD  02/13/24 1545      I was personally available for consultation in the emergency department.  I have reviewed the chart and agree with the documentation recorded by the MLP, including the assessment, treatment plan, and disposition.    The patient presented with   Chief Complaint   Patient presents with    Groin Swelling         ICD-10-CM    1. Hydrocele of testis  N43.3            MD Micheal Robbins Neil N, MD  02/13/24 1545       Vini Burton MD  02/13/24 1546

## 2024-02-10 NOTE — ED TRIAGE NOTES
Patient is coming in with swollen left testicle for the past 2 days. Denies urinary symptoms or pain.